# Patient Record
Sex: MALE | Race: WHITE | NOT HISPANIC OR LATINO | URBAN - METROPOLITAN AREA
[De-identification: names, ages, dates, MRNs, and addresses within clinical notes are randomized per-mention and may not be internally consistent; named-entity substitution may affect disease eponyms.]

---

## 2017-01-01 ENCOUNTER — OFFICE VISIT - HEALTHEAST (OUTPATIENT)
Dept: GERIATRICS | Facility: CLINIC | Age: 57
End: 2017-01-01

## 2017-01-01 ENCOUNTER — AMBULATORY - HEALTHEAST (OUTPATIENT)
Dept: GERIATRICS | Facility: CLINIC | Age: 57
End: 2017-01-01

## 2017-01-01 ENCOUNTER — SURGERY - HEALTHEAST (OUTPATIENT)
Dept: SURGERY | Facility: CLINIC | Age: 57
End: 2017-01-01

## 2017-01-01 ENCOUNTER — OFFICE VISIT - HEALTHEAST (OUTPATIENT)
Dept: WOUND CARE | Facility: HOSPITAL | Age: 57
End: 2017-01-01

## 2017-01-01 ENCOUNTER — ANESTHESIA - HEALTHEAST (OUTPATIENT)
Dept: SURGERY | Facility: CLINIC | Age: 57
End: 2017-01-01

## 2017-01-01 ENCOUNTER — AMBULATORY - HEALTHEAST (OUTPATIENT)
Dept: SCHEDULING | Facility: CLINIC | Age: 57
End: 2017-01-01

## 2017-01-01 DIAGNOSIS — G89.4 CHRONIC PAIN SYNDROME: ICD-10-CM

## 2017-01-01 DIAGNOSIS — N18.5 CRD (CHRONIC RENAL DISEASE), STAGE 5: ICD-10-CM

## 2017-01-01 DIAGNOSIS — E11.9 TYPE 2 DIABETES MELLITUS (H): ICD-10-CM

## 2017-01-01 DIAGNOSIS — B20 AIDS (H): ICD-10-CM

## 2017-01-01 DIAGNOSIS — K74.60 LIVER CIRRHOSIS (H): ICD-10-CM

## 2017-01-01 DIAGNOSIS — G89.29 CHRONIC PAIN: ICD-10-CM

## 2017-01-01 DIAGNOSIS — I10 ESSENTIAL HYPERTENSION: ICD-10-CM

## 2017-01-01 DIAGNOSIS — Z93.3 COLOSTOMY IN PLACE (H): ICD-10-CM

## 2017-01-01 DIAGNOSIS — Z93.2 ILEOSTOMY IN PLACE (H): ICD-10-CM

## 2017-01-01 DIAGNOSIS — T79.A21A TRAUMATIC COMPARTMENT SYNDROME OF RIGHT LOWER EXTREMITY, INITIAL ENCOUNTER (H): ICD-10-CM

## 2017-01-01 DIAGNOSIS — D64.9 ANEMIA: ICD-10-CM

## 2017-01-01 DIAGNOSIS — T79.A29A: ICD-10-CM

## 2017-01-01 DIAGNOSIS — D61.818 PANCYTOPENIA (H): ICD-10-CM

## 2017-01-01 DIAGNOSIS — N99.528 COMPLICATION OF UROSTOMY (H): ICD-10-CM

## 2017-01-01 DIAGNOSIS — T79.A21S TRAUMATIC COMPARTMENT SYNDROME OF RIGHT LOWER EXTREMITY, SEQUELA: ICD-10-CM

## 2017-01-01 DIAGNOSIS — D62 ACUTE BLOOD LOSS ANEMIA: ICD-10-CM

## 2017-01-01 DIAGNOSIS — Z75.8 DISCHARGE PLANNING ISSUES: ICD-10-CM

## 2017-01-01 DIAGNOSIS — T79.A21D TRAUMATIC COMPARTMENT SYNDROME OF RIGHT LOWER EXTREMITY, SUBSEQUENT ENCOUNTER: ICD-10-CM

## 2017-01-01 DIAGNOSIS — R53.83 LETHARGY: ICD-10-CM

## 2017-01-01 ASSESSMENT — MIFFLIN-ST. JEOR
SCORE: 1503.91
SCORE: 1503.91

## 2021-05-30 VITALS
HEIGHT: 64 IN | WEIGHT: 172.6 LBS | WEIGHT: 172.6 LBS | HEIGHT: 64 IN | BODY MASS INDEX: 29.47 KG/M2 | BODY MASS INDEX: 29.47 KG/M2

## 2021-05-31 VITALS — BODY MASS INDEX: 28.06 KG/M2 | WEIGHT: 163.5 LBS

## 2021-05-31 VITALS — BODY MASS INDEX: 28.37 KG/M2 | WEIGHT: 165.3 LBS

## 2021-05-31 VITALS — BODY MASS INDEX: 28.91 KG/M2 | WEIGHT: 168.4 LBS

## 2021-05-31 VITALS — WEIGHT: 172.7 LBS | BODY MASS INDEX: 29.64 KG/M2

## 2021-06-09 NOTE — ANESTHESIA POSTPROCEDURE EVALUATION
Patient: Hosea Garrison  IRRIGATION AND DEBRIDEMENT WITH WOUND RECLOSURE OF RIGHT LEG  Anesthesia type: general    Patient location: PACU  Last vitals:   Vitals:    04/07/17 1136   BP: 126/72   Pulse: 82   Resp: 16   Temp:    SpO2: 100%     Post vital signs: stable  Level of consciousness: awake and responds to simple questions  Post-anesthesia pain: pain controlled  Post-anesthesia nausea and vomiting: no  Pulmonary: unassisted, return to baseline  Cardiovascular: stable and blood pressure at baseline  Hydration: adequate  Anesthetic events: no    QCDR Measures:  ASA# 11 - Erin-op Cardiac Arrest: ASA11B - Patient did NOT experience unanticipated cardiac arrest  ASA# 12 - Erin-op Mortality Rate: ASA12B - Patient did NOT die  ASA# 13 - PACU Re-Intubation Rate: ASA13B - Patient did NOT require a new airway mgmt  ASA# 10 - Composite Anes Safety: ASA10A - No serious adverse event  ASA# 38 - New Corneal Injury: ASA38A - No new exposure keratitis or corneal abrasion in PACU    Additional Notes:

## 2021-06-09 NOTE — ANESTHESIA CARE TRANSFER NOTE
Pt breathing spontaneously, LMA out. Spontaneous respirations with SFM to PACU. VSS.    Last vitals:   Vitals:    04/07/17 1136   BP: 126/72   Pulse: 82   Resp: 16   Temp:    SpO2: 100%     Patient's level of consciousness is drowsy  Spontaneous respirations: yes  Maintains airway independently: yes  Dentition unchanged: yes  Oropharynx: oropharynx clear of all foreign objects    QCDR Measures:  ASA# 20 - Surgical Safety Checklist: ASA20A - Safety Checks Done  PQRS# 430 - Adult PONV Prevention: NA - Not adult patient, not GA or 3 or more risk factors NOT present  ASA# 8 - Peds PONV Prevention: NA - Not pediatric patient, not GA or 2 or more risk factors NOT present  PQRS# 424 - Erin-op Temp Management: 4559F - At least one body temp DOCUMENTED => 35.5C or 95.9F within required timeframe  PQRS# 426 - PACU Transfer Protocol: - Transfer of care checklist used  ASA# 14 - Acute Post-op Pain: ASA14B - Patient did NOT experience pain >= 7 out of 10    I completed my SBAR handoff to the receiving nurse per policy and procedure.

## 2021-06-09 NOTE — ANESTHESIA PREPROCEDURE EVALUATION
Anesthesia Evaluation        Airway   Mallampati: II  Neck ROM: full   Pulmonary    (+) decreased breath sounds (pleural effusion R.), a smoker    ROS comment: Pleural effusion                         Cardiovascular - normal exam  (+) hypertension, ,      Neuro/Psych      Endo/Other    (+) diabetes mellitus using insulin,      GI/Hepatic/Renal    (+)   chronic renal disease,     Comments: H/O lower GI bleed.  Cirrhosis     Other findings: HIV+.  Gluc=133  Ca=7.6  Cr=4.99  Hgb=7.1  Plt=79K             Dental    (+) edentulous                         Anesthesia Plan  Planned anesthetic: general LMA    ASA 3   Induction: intravenous   Anesthetic plan and risks discussed with: patient  Anesthesia plan special considerations: antiemetics,   Post-op plan: routine recovery

## 2021-06-09 NOTE — ANESTHESIA CARE TRANSFER NOTE
Last vitals:   Vitals:    04/06/17 0334   BP: 131/73   Pulse: 88   Resp: 16   Temp: 36.8  C (98.3  F)   SpO2: 100%     Patient's level of consciousness is awake  Spontaneous respirations: yes  Maintains airway independently: yes  Dentition unchanged: yes  Oropharynx: oropharynx clear of all foreign objects    QCDR Measures:  ASA# 20 - Surgical Safety Checklist: ASA20A - Safety Checks Done  PQRS# 430 - Adult PONV Prevention: 4558F-8P - Pt did NOT receive => 2 anti-emetic agents  ASA# 8 - Peds PONV Prevention: NA - Not pediatric patient, not GA or 2 or more risk factors NOT present  PQRS# 424 - Erin-op Temp Management: 4559F - At least one body temp DOCUMENTED => 35.5C or 95.9F within required timeframe  PQRS# 426 - PACU Transfer Protocol: - Transfer of care checklist used  ASA# 14 - Acute Post-op Pain: ASA14B - Patient did NOT experience pain >= 7 out of 10    I completed my SBAR handoff to the receiving nurse per policy and procedure.

## 2021-06-09 NOTE — ANESTHESIA POSTPROCEDURE EVALUATION
Patient: Hosea Garrison  FASCIOTOMY RIGHT LOWER LEG  Anesthesia type: general    Patient location: PACU  Last vitals:   Vitals:    04/06/17 0522   BP:    Pulse:    Resp:    Temp: (P) 36.7  C (98.1  F)   SpO2:      Post vital signs: stable  Level of consciousness: awake and responds to simple questions  Post-anesthesia pain: pain controlled  Post-anesthesia nausea and vomiting: no  Pulmonary: unassisted, return to baseline  Cardiovascular: stable and blood pressure at baseline  Hydration: adequate  Anesthetic events: no    QCDR Measures:  ASA# 11 - Erin-op Cardiac Arrest: ASA11B - Patient did NOT experience unanticipated cardiac arrest  ASA# 12 - Erin-op Mortality Rate: ASA12B - Patient did NOT die  ASA# 13 - PACU Re-Intubation Rate: ASA13B - Patient did NOT require a new airway mgmt  ASA# 10 - Composite Anes Safety: ASA10A - No serious adverse event  ASA# 38 - New Corneal Injury: ASA38A - No new exposure keratitis or corneal abrasion in PACU    Additional Notes:

## 2021-06-09 NOTE — ANESTHESIA PREPROCEDURE EVALUATION
Anesthesia Evaluation        Airway   Mallampati: II  Neck ROM: full   Pulmonary    (+) decreased breath sounds (pleural effusion R.), a smoker    ROS comment: Pleural effusion                         Cardiovascular - normal exam  (+) hypertension, ,      Neuro/Psych      Endo/Other    (+) diabetes mellitus using insulin,      GI/Hepatic/Renal    (+)   chronic renal disease CRI and ARF,     Comments: H/O lower GI bleed.  Cirrhosis     Other findings: HIV+.  Gluc=145  Ca=7.0  Cr=4.3  Hgb=7.9             Dental    (+) edentulous                       Anesthesia Plan  Planned anesthetic: general endotracheal    ASA 3   Induction: intravenous   Anesthetic plan and risks discussed with: patient  Anesthesia plan special considerations: rapid sequence induction,

## 2021-06-10 NOTE — PROGRESS NOTES
[facility]        Visit Type: Review Of Multiple Medical Conditions (Liver cirrhosis, type 2 diabetes, CRD)    Code Status:  FULL CODE  Facility:  [facility] [[id number]]          PCP: No Primary Care Provider       PHONE: None     FAX:[phone]        ASSESSMENT/PLAN:  1. Liver cirrhosis     2. CRD (chronic renal disease), stage 5     3. Type 2 diabetes mellitus     4. Compartment syndrome, traumatic, lower extremity     5. Chronic pain     6. Acute blood loss anemia     7. AIDS     8. Essential hypertension       1. Compartment syndrome, traumatic, lower extremity   status post fasciotomy and incision and drainage of wound, also has a right tibial fracture now with intermittent pain.  A long discussion was done with the patient regarding his pain medications.  He is on a fentanyl patch and also on 10-15 mg every 4 hours oxycodone.  We did discuss risks and benefits of narcotic use.  Ortho follow up done.  Patient is now on a long leg cast and will return to or so in 5 weeks time for cast removal and placement of a hinged knee brace    2. Acute blood loss anemia   now with pancytopenia, hemoglobin low at 7.6, no evidence of ongoing blood loss.  Pancytopenia is multifactorial with acute blood loss anemia from surgery, CRD stage V, liver cirrhosis, HIV/AIDS.  Aranesp has been started by renal MD    3. CRD (chronic renal disease), stage 5   GFR is lower at 12 from 17 with creatinine at 4.84 from 3.81, and BUN 81 from 60.  Will follow multiple recommendations from renal MD. his weight has decreased by 3 pounds after aggressive diuresis.     4. Liver cirrhosis   LFTs normal except for low albumin, INR 1.2, NH 4 at 76 from 63.  We increased lactulose 45 mL in a.m., 30 mL twice daily improvement in lethargy    5. AIDS   stable on ART, we will need to make sure the patient has a 6 month follow-up with ID    6. Type 2 diabetes mellitus   check  hemoglobin A1c, blood sugars range of 100-202, blood sugars improved with decreasing Lantus to 10 units daily and changing NovoLog sliding scale as follows, less than 180 no insulin sliding scale, 1  units, 221-263 units, 2  units, 301-345 units, 3  units, greater than 408 units.  Hold NovoLog sliding scale for blood sugars less than 100.  Again, long discussion was done regarding hypoglycemia as 1 of the possible causes of his lethargy.  Explained rationality for changing doses of his Lantus and NovoLog sliding scale.     7. Chronic pain   as above discussed pain management    8. Essential hypertension   blood pressures are all stable          HISTORY OF PRESENT ILLNESS:   Hosea Garrison is a 56 y.o. male with a history of anal squamous cell cancer status post resection, colostomy, diverting urostomy, kidney cancer status post nephrectomy, liver cirrhosis with recurrent ascites and paracentesis, hepatitis B, HIV/AIDS, type 2 diabetes, hypertension, chronic pain who was just recently discharged from [facility] but fell in his home and injured his right knee.  X-ray showed transverse comminuted impacted fracture of the proximal tibial metaphysis.  Because of his significant amount of pain, he was found to have compartment syndrome and he was taken to the OR emergently by orthopedic surgeon and underwent fasciotomy of the right lower leg on 4/6/17.  He then underwent irrigation and debridement with wound reclosure of the right leg on 4/7/17.  The patient suffered from acute blood loss anemia and received 1 unit packed RBC perioperatively.  On the day prior to his discharge he developed some bleeding at the junction of colostomy site which resolved with pressure and placement of a new pouch.  He was seen by ostomy nurse.  He has not had any recurrence of his bleeding.    Currently, he states that his pain on his right leg is improving on a fentanyl patch and oxycodone but states that  occasionally oxycodone does not work despite big doses.  States that his lethargy and sleepiness has improved after increasing lactulose especially during the day.  His NH 4 is higher at 76 from 63 and his GFR is at 12, will await labs.  He also has pancytopenia with hemoglobin at 7.6, WBC 1.8, platelet 104.  He was seen by renal MD and recommendation is to increase torsemide to 20 mg twice daily, start metoprolol 5 mg daily for 5 days, low-sodium diet and daily weights and 1.5 L water restriction.  His weight has decreased by about 3 pounds.  He was also started on aranesp.  He denies any nausea or vomiting, abdominal pain..  He also denies any fevers or chills, chest pains or shortness of breath.  He states that he still feels weak after his surgery but he is able to participate in physical therapy despite his pain.  His blood sugars have improved after we have decreased Lantus and he does not have any hypo-glycemic events.  He is on Lantus and NovoLog sliding scale.    Other review of systems are negative.      PAST MEDICAL/SURGICAL HISTORY:  Past Medical History:   Diagnosis Date     AIDS (acquired immune deficiency syndrome)      Cancer     History of Kidney & Rectal Cancer     Chronic renal failure      Cirrhosis      Colostomy in place      Diabetes mellitus     Type II     History of urinary tract infection      Hx of fever      Hydronephrosis      Hypertension      Ileostomy in place      Lower GI bleed      Thrombocytopenia      Past Surgical History:   Procedure Laterality Date     COLON SURGERY       COLOSTOMY Left      FASCIOTOMY Right 4/6/2017    Procedure: FASCIOTOMY RIGHT LOWER LEG;  Surgeon: Quentin Landis MD;  Location: [facility];  Service:      FRACTURE SURGERY Right     right knee and right ankle surgery     ILEOSTOMY Right      PICC  6/18/2015            SOCIAL HISTORY: He is single and lives alone in his own apartment, he has been in a relationship with his partner for 30 years  although they are not living together currently.  He is a smoker, one fourth pack per day for 30 years although he states that his last cigarette was 3 months ago.  He quit alcohol many years ago.      MEDICATIONS:  Reviewed per TCU orders summary    ALLERGIES:  Allergies   Allergen Reactions     Compazine [Prochlorperazine]      Parkinson's type symptoms     Sulfacetamide Other (See Comments)     Patient can't remember         PHYSICAL EXAMINATION:  Vital signs 123/69, 96.7, 72, 16, 98%, weight 153 pounds  General: Slightly sleepy, oriented x3, not in any form of acute distress, answers questions appropriately, follows simple commands, less conversant, pale, has a sad affect  HEENT: Pale conjunctiva, anicteric sclerae, oral mucosa is moist, oral thrush resolved, no upper or lower teeth   NECK: Supple, without any lymphadenopathy, thyromegaly or any masses  LUNG: Clear to auscultation, good chest expansion. There are no crackles, no wheezes, normal AP diameter  BACK: No kyphosis of the thoracic spine  CVS: There is good S1  S2, there are no murmurs, no heaves, rhythm is regular  ABDOMEN: Globular with ascites, soft, nontender to palpation, no organomegaly, good bowel sounds, positive colostomy, site is clean, positive ileostomy conduit with clear urine per catheter  EXTREMITIES: Good range of motion on both upper and lower extremities except on right lower extremity which is wrapped in a long leg cast, able to wiggle right toes, left leg with trace pedal edema, no cyanosis or clubbing, no calf tenderness, right arm AV fistula with good bruits  SKIN: Warm and dry, no rashes or erythema noted    LABS:  Lab Results   Component Value Date    WBC 1.9 (LL) 04/12/2017    HGB 8.2 (L) 04/12/2017    HCT 23.8 (L) 04/12/2017    MCV 96 04/12/2017    PLT 93 (L) 04/12/2017     Lab Results   Component Value Date    CREATININE 3.88 (H) 04/12/2017    BUN 64 (H) 04/12/2017     04/12/2017    K 5.0 04/12/2017     (H)  04/12/2017    CO2 16 (L) 04/12/2017           35 minutes of total time spent, greater than 55% of the time spent in coordination of care and counseling regarding the above medical issues and plan of care. I have reviewed the patient's medical records, labs and medications.       Electronically signed by:Zeynep Mijares MD

## 2021-06-10 NOTE — PROGRESS NOTES
[facility]        Visit Type: Review Of Multiple Medical Conditions (End-stage liver disease, end-stage renal disease, type 2 diabetes, compartment syndrome)    Code Status:  FULL CODE  Facility:  [facility] [[id number]]          PCP: No Primary Care Provider       PHONE: None     FAX:[phone]        ASSESSMENT/PLAN:  1. Liver cirrhosis     2. CRD (chronic renal disease), stage 5     3. Type 2 diabetes mellitus     4. Chronic pain     5. Compartment syndrome, traumatic, lower extremity     6. Essential hypertension     7. AIDS     8. Pancytopenia       1. Compartment syndrome, traumatic, lower extremity   status post fasciotomy and incision and drainage of wound, also has a right tibial fracture now with intermittent pain.  A long discussion was done with the patient regarding his pain medications.  He is on a fentanyl patch and we have decreased oxycodone to 10-15 mg every 6 hours as needed.  He is to further decrease oxycodone in the next few days if he does not have a lot of pain.  We did discuss risks and benefits of narcotic use.  Ortho follow up done and he could do partial weightbearing up to 50% of body weight.  He will need further rehab as he has 3 flights of steps to navigate when he goes home.  Patient is now on a long leg cast was supposed to return  in 5 weeks time for cast removal and placement of a hinged knee brace but was seen again by orthopedics since he wants to go home soon but he has a lot of stairs in his home.  Per orthopedics, he can do some toe touching, partial weightbearing up to 50% of body weight.     2. Acute blood loss anemia   now with pancytopenia, hemoglobin low at 8.3 from 7.6, no evidence of ongoing blood loss.  Pancytopenia is multifactorial with acute blood loss anemia from surgery, CRD stage V, liver cirrhosis, HIV/AIDS.  Aranesp has been started by renal MD and he seen this week for another  injection.  He also completed iron infusion, i.e. Venofer ×2 doses    3. CRD (chronic renal disease), stage 5   GFR is lower at 12 from 17 with creatinine at 4.84 from 3.81, and BUN 81 from 60.  Will follow multiple recommendations from renal MD. his weight has decreased by 3 pounds after aggressive diuresis.  He is now off metolazone and we would expect GFR to increase.  Will check BMP on 5/25    4. Liver cirrhosis   LFTs normal except for low albumin, INR 1.2, NH 4 at 161 from 63.  We increased lactulose 45 mL 3 times daily, no hepatic encephalopathy.  Will check NH4 on 5/25    5. AIDS   stable on ART, we will need to make sure the patient has a 6 month follow-up with ID    6. Type 2 diabetes mellitus   check hemoglobin A1c, blood sugars range of 106-200.   We increased Lantus to 12 units daily, continue NovoLog sliding scale    7. Chronic pain   as above discussed pain management extensively   8. Essential hypertension   blood pressures are all stable          HISTORY OF PRESENT ILLNESS:   Hosea Garrison is a 56 y.o. male with a history of anal squamous cell cancer status post resection, colostomy, diverting urostomy, kidney cancer status post nephrectomy, liver cirrhosis with recurrent ascites and paracentesis, hepatitis B, HIV/AIDS, type 2 diabetes, hypertension, chronic pain who was just recently discharged from [facility] but fell in his home and injured his right knee.  X-ray showed transverse comminuted impacted fracture of the proximal tibial metaphysis.  Because of his significant amount of pain, he was found to have compartment syndrome and he was taken to the OR emergently by orthopedic surgeon and underwent fasciotomy of the right lower leg on 4/6/17.  He then underwent irrigation and debridement with wound reclosure of the right leg on 4/7/17.  The patient suffered from acute blood loss anemia and received 1 unit packed RBC perioperatively.  On the day prior to his discharge he developed some  bleeding at the junction of colostomy site which resolved with pressure and placement of a new pouch.  He was seen by ostomy nurse.  He has not had any recurrence of his bleeding.    Currently, he states that his pain on his right leg is improving on a fentanyl patch and oxycodone but states that occasionally oxycodone does not work despite big doses.  A long discussion was done regarding decreasing his pain medications and he reluctantly agreed to change frequency of chronic use.  He is now on oxycodone 10-15 mg 4 times daily as needed.  States that his lethargy and sleepiness has improved after increasing lactulose especially during the day.  His NH 4 is higher at 166 from 63 and his GFR is at 12.  He also has pancytopenia with hemoglobin at 8.3, WBC 2.2, platelet 92.  He was seen by renal MD and recommendation is to increase torsemide to 20 mg twice daily, plated metolazone 5 mg daily for 5 days, low-sodium diet and daily weights and 1.5 L water restriction.  His weight has decreased by about 3 pounds.  He was also started on aranesp and had another injection next week.  His most recent hemoglobin on 5/18 is 8.3.  He denies any nausea or vomiting, abdominal pain..  He also denies any fevers or chills, chest pains or shortness of breath.  He states that he still feels weak after his surgery but he is able to participate in physical therapy despite his pain.  His blood sugars are now improving after we have decreased Lantus but he does not have any hypo-glycemic events.  He is on Lantus and NovoLog sliding scale.    Other review of systems are negative.      PAST MEDICAL/SURGICAL HISTORY:  Past Medical History:   Diagnosis Date     AIDS (acquired immune deficiency syndrome)      Cancer     History of Kidney & Rectal Cancer     Chronic renal failure      Cirrhosis      Colostomy in place      Diabetes mellitus     Type II     History of urinary tract infection      Hx of fever      Hydronephrosis      Hypertension       Ileostomy in place      Lower GI bleed      Thrombocytopenia      Past Surgical History:   Procedure Laterality Date     COLON SURGERY       COLOSTOMY Left      FASCIOTOMY Right 4/6/2017    Procedure: FASCIOTOMY RIGHT LOWER LEG;  Surgeon: Quentin Landis MD;  Location: [facility];  Service:      FRACTURE SURGERY Right     right knee and right ankle surgery     ILEOSTOMY Right      PICC  6/18/2015            SOCIAL HISTORY: He is single and lives alone in his own apartment, he has been in a relationship with his partner for 30 years although they are not living together currently.  He is a smoker, one fourth pack per day for 30 years although he states that his last cigarette was 3 months ago.  He quit alcohol many years ago.      MEDICATIONS:  Reviewed per TCU orders summary    ALLERGIES:  Allergies   Allergen Reactions     Compazine [Prochlorperazine]      Parkinson's type symptoms     Sulfacetamide Other (See Comments)     Patient can't remember         PHYSICAL EXAMINATION:  Vital signs able  General: Alert, oriented x3, not in any form of acute distress, answers questions appropriately, follows simple commands, less conversant, pale, has a sad affect  HEENT: Pale conjunctiva, anicteric sclerae, oral mucosa is moist, oral thrush resolved, no upper or lower teeth   NECK: Supple, without any lymphadenopathy, thyromegaly or any masses  BACK: No kyphosis of the thoracic spine  ABDOMEN: Globular with ascites, soft, nontender to palpation, no organomegaly,  colostomy, site is clean, ileostomy conduit with clear urine per catheter  EXTREMITIES: Good range of motion on both upper and lower extremities except on right lower extremity which is wrapped in a long leg cast, able to wiggle right toes, left leg with trace pedal edema, no cyanosis or clubbing, no calf tenderness, right arm AV fistula with good bruits  SKIN: Warm and dry, no rashes or erythema noted    LABS:  Lab Results   Component Value Date     WBC 1.9 (LL) 04/12/2017    HGB 8.2 (L) 04/12/2017    HCT 23.8 (L) 04/12/2017    MCV 96 04/12/2017    PLT 93 (L) 04/12/2017     Lab Results   Component Value Date    CREATININE 3.88 (H) 04/12/2017    BUN 64 (H) 04/12/2017     04/12/2017    K 5.0 04/12/2017     (H) 04/12/2017    CO2 16 (L) 04/12/2017       35 minutes TT spent, >50% in coordination of care and counseling regarding the above medical issues and plan of care.  A long discussion was done with patient and nursing staff regarding above problems.  I reviewed medications/labs/available medical records.    Electronically signed by:Zeynep Mijares MD

## 2021-06-10 NOTE — PROGRESS NOTES
[facility]        Visit Type: Review Of Multiple Medical Conditions (Liver cirrhosis, CRD, compartment syndrome)    Code Status:  FULL CODE  Facility:  [facility] [[id number]]          PCP: No Primary Care Provider       PHONE: None     FAX:[phone]        ASSESSMENT/PLAN:  1. Liver cirrhosis     2. CRD (chronic renal disease), stage 5     3. Type 2 diabetes mellitus     4. Compartment syndrome, traumatic, lower extremity     5. AIDS     6. Chronic pain     7. Essential hypertension     8. Acute blood loss anemia       1. Compartment syndrome, traumatic, lower extremity   status post fasciotomy and incision and drainage of wound, also has a right tibial fracture now with intermittent pain.  A long discussion was done with the patient regarding his pain medications.  He is on a fentanyl patch and also on 10-15 mg every 4 hours oxycodone.  We did discuss risks and benefits of narcotic use.  Ortho follow up today to see if he can get an okay to do more weightbearing as he has 3 flights of steps to navigate when he goes home.  Patient is now on a long leg cast and will return to or so in 5 weeks time for cast removal and placement of a hinged knee brace .  Discussed placement issues with     2. Acute blood loss anemia   now with pancytopenia, hemoglobin low at 7.6, no evidence of ongoing blood loss.  Pancytopenia is multifactorial with acute blood loss anemia from surgery, CRD stage V, liver cirrhosis, HIV/AIDS.  Aranesp has been started by renal MD and he has a follow-up this week for another injection    3. CRD (chronic renal disease), stage 5   GFR is lower at 12 from 17 with creatinine at 4.84 from 3.81, and BUN 81 from 60.  Will follow multiple recommendations from renal MD. his weight has decreased by 3 pounds after aggressive diuresis.  He is now off metolazone and we would expect GFR to increase    4. Liver cirrhosis   LFTs  normal except for low albumin, INR 1.2, NH 4 at 161 from 63.  We increased lactulose 45 mL 3 times daily    5. AIDS   stable on ART, we will need to make sure the patient has a 6 month follow-up with ID    6. Type 2 diabetes mellitus   check hemoglobin A1c, blood sugars range of 127-190, blood sugars improved with decreasing Lantus to 10 units daily and changing NovoLog sliding scale as follows, less than 180 no insulin sliding scale, 1  units, 221-263 units, 2  units, 301-345 units, 3  units, greater than 408 units.  Hold NovoLog sliding scale for blood sugars less than 100.  Again, long discussion was done regarding hypoglycemia as 1 of the possible causes of his lethargy.  Explained rationality for changing doses of his Lantus and NovoLog sliding scale.     7. Chronic pain   as above discussed pain management extensively   8. Essential hypertension   blood pressures are all stable          HISTORY OF PRESENT ILLNESS:   Hosea Garrison is a 56 y.o. male with a history of anal squamous cell cancer status post resection, colostomy, diverting urostomy, kidney cancer status post nephrectomy, liver cirrhosis with recurrent ascites and paracentesis, hepatitis B, HIV/AIDS, type 2 diabetes, hypertension, chronic pain who was just recently discharged from [facility] but fell in his home and injured his right knee.  X-ray showed transverse comminuted impacted fracture of the proximal tibial metaphysis.  Because of his significant amount of pain, he was found to have compartment syndrome and he was taken to the OR emergently by orthopedic surgeon and underwent fasciotomy of the right lower leg on 4/6/17.  He then underwent irrigation and debridement with wound reclosure of the right leg on 4/7/17.  The patient suffered from acute blood loss anemia and received 1 unit packed RBC perioperatively.  On the day prior to his discharge he developed some bleeding at the junction of colostomy site which  resolved with pressure and placement of a new pouch.  He was seen by ostomy nurse.  He has not had any recurrence of his bleeding.    Currently, he states that his pain on his right leg is improving on a fentanyl patch and oxycodone but states that occasionally oxycodone does not work despite big doses.  A long discussion was done regarding decreasing his pain medications but he states that he is not ready at this point to decrease his narcotics.  States that his lethargy and sleepiness has improved after increasing lactulose especially during the day.  His NH 4 is higher at 161 from 63 and his GFR is at 12.  He also has pancytopenia with hemoglobin at 7.6, WBC 1.8, platelet 104.  He was seen by renal MD and recommendation is to increase torsemide to 20 mg twice daily, plated metolazone 5 mg daily for 5 days, low-sodium diet and daily weights and 1.5 L water restriction.  His weight has decreased by about 3 pounds.  He was also started on aranesp.  He denies any nausea or vomiting, abdominal pain..  He also denies any fevers or chills, chest pains or shortness of breath.  He states that he still feels weak after his surgery but he is able to participate in physical therapy despite his pain.  His blood sugars have improved after we have decreased Lantus and he does not have any hypo-glycemic events.  He is on Lantus and NovoLog sliding scale.    Other review of systems are negative.      PAST MEDICAL/SURGICAL HISTORY:  Past Medical History:   Diagnosis Date     AIDS (acquired immune deficiency syndrome)      Cancer     History of Kidney & Rectal Cancer     Chronic renal failure      Cirrhosis      Colostomy in place      Diabetes mellitus     Type II     History of urinary tract infection      Hx of fever      Hydronephrosis      Hypertension      Ileostomy in place      Lower GI bleed      Thrombocytopenia      Past Surgical History:   Procedure Laterality Date     COLON SURGERY       COLOSTOMY Left      FASCIOTOMY  Right 4/6/2017    Procedure: FASCIOTOMY RIGHT LOWER LEG;  Surgeon: Quentin Landis MD;  Location: [facility];  Service:      FRACTURE SURGERY Right     right knee and right ankle surgery     ILEOSTOMY Right      PICC  6/18/2015            SOCIAL HISTORY: He is single and lives alone in his own apartment, he has been in a relationship with his partner for 30 years although they are not living together currently.  He is a smoker, one fourth pack per day for 30 years although he states that his last cigarette was 3 months ago.  He quit alcohol many years ago.      MEDICATIONS:  Reviewed per TCU orders summary    ALLERGIES:  Allergies   Allergen Reactions     Compazine [Prochlorperazine]      Parkinson's type symptoms     Sulfacetamide Other (See Comments)     Patient can't remember         PHYSICAL EXAMINATION:  Vital signs 123/63, 96.6, 57, 22, 98%  General: Alert, oriented x3, not in any form of acute distress, answers questions appropriately, follows simple commands, less conversant, pale, has a sad affect  HEENT: Pale conjunctiva, anicteric sclerae, oral mucosa is moist, oral thrush resolved, no upper or lower teeth   NECK: Supple, without any lymphadenopathy, thyromegaly or any masses  LUNG: Clear to auscultation, good chest expansion. There are no crackles, no wheezes, normal AP diameter  BACK: No kyphosis of the thoracic spine  CVS: There is good S1  S2, there are no murmurs, no heaves, rhythm is regular  ABDOMEN: Globular with ascites, soft, nontender to palpation, no organomegaly, good bowel sounds,  colostomy, site is clean, ileostomy conduit with clear urine per catheter  EXTREMITIES: Good range of motion on both upper and lower extremities except on right lower extremity which is wrapped in a long leg cast, able to wiggle right toes, left leg with trace pedal edema, no cyanosis or clubbing, no calf tenderness, right arm AV fistula with good bruits  SKIN: Warm and dry, no rashes or erythema  noted    LABS:  Lab Results   Component Value Date    WBC 1.9 (LL) 04/12/2017    HGB 8.2 (L) 04/12/2017    HCT 23.8 (L) 04/12/2017    MCV 96 04/12/2017    PLT 93 (L) 04/12/2017     Lab Results   Component Value Date    CREATININE 3.88 (H) 04/12/2017    BUN 64 (H) 04/12/2017     04/12/2017    K 5.0 04/12/2017     (H) 04/12/2017    CO2 16 (L) 04/12/2017       35 minutes TT spent, >50% in coordination of care and counseling regarding the above medical issues and plan of care.  A long discussion was done with patient and nursing staff regarding above problems.  I reviewed medications/labs/available medical records.      Electronically signed by:Zeynep Mijares MD

## 2021-06-10 NOTE — PROGRESS NOTES
[facility]        Visit Type: Review Of Multiple Medical Conditions (Liver cirrhosis, diabetes type 2, CRD)    Code Status:  FULL CODE  Facility:  [facility] [[id number]]          PCP: No Primary Care Provider       PHONE: None     FAX:[phone]        ASSESSMENT/PLAN:  1. Compartment syndrome, traumatic, lower extremity     2. Liver cirrhosis     3. CRD (chronic renal disease), stage 5     4. Type 2 diabetes mellitus     5. Acute blood loss anemia     6. Chronic pain     7. Essential hypertension     8. AIDS       1. Compartment syndrome, traumatic, lower extremity   status post fasciotomy and incision and drainage of wound, also has a right tibial fracture now with intermittent pain.  A long discussion was done with the patient regarding his pain medications.  He states that he does not want his pain medications to be increased but that he wants them to be given on time when he asks for them.  He is on a fentanyl patch and also on 10-15 mg every 4 hours oxycodone.  We did discuss risks and benefits of narcotic use.  Ortho follow up 4/26    2. Acute blood loss anemia   now with pancytopenia, hemoglobin low at 7.6, no evidence of ongoing blood loss.  Pancytopenia is multifactorial with acute blood loss anemia from surgery, CRD stage V, liver cirrhosis, HIV/AIDS.  Follow-up with renal MD for possible EPO shots.  Consider blood transfusion if hemoglobin less than 7.  We also need to follow-up with infectious disease    3. CRD (chronic renal disease), stage 5   GFR is lower at 12 from 17 with creatinine at 4.84 from 3.81, and BUN 81 from 60.  Will decrease torsemide to 20 mg daily.     4. Liver cirrhosis   LFTs normal except for low albumin, INR 1.2, NH 4 at 63.  Give lactulose 30 mL 3 times daily    5. AIDS   stable on ART    6. Type 2 diabetes mellitus   check hemoglobin A1c, blood sugars range of 93/164, blood sugars improved with decreasing  Lantus to 10 units daily and changing NovoLog sliding scale as follows, less than 180 no insulin sliding scale, 1  units, 221-263 units, 2  units, 301-345 units, 3  units, greater than 408 units.  Hold NovoLog sliding scale for blood sugars less than 100    7. Chronic pain   as above discussed pain management    8. Essential hypertension   blood pressures are all stable          HISTORY OF PRESENT ILLNESS:   Hosea Garrison is a 56 y.o. male with a history of anal squamous cell cancer status post resection, colostomy, diverting urostomy, kidney cancer status post nephrectomy, liver cirrhosis with recurrent ascites and paracentesis, hepatitis B, HIV/AIDS, type 2 diabetes, hypertension, chronic pain who was just recently discharged from [facility] but fell in his home and injured his right knee.  X-ray showed transverse comminuted impacted fracture of the proximal tibial metaphysis.  Because of his significant amount of pain, he was found to have compartment syndrome and he was taken to the OR emergently by orthopedic surgeon and underwent fasciotomy of the right lower leg on 4/6/17.  He then underwent irrigation and debridement with wound reclosure of the right leg on 4/7/17.  The patient suffered from acute blood loss anemia and received 1 unit packed RBC perioperatively.  On the day prior to his discharge he developed some bleeding at the junction of colostomy site which resolved with pressure and placement of a new pouch.  He was seen by ostomy nurse.  He has not had any recurrence of his bleeding.    Currently, he states that he still has a lot of pain on his right leg despite getting a fentanyl patch and oxycodone but states that occasionally oxycodone does not work despite big doses.  States that he is more sleepy and lethargic during the day.  His NH 4 is higher at 63 and his GFR is at 12.  He also has pancytopenia with hemoglobin at 7.6, WBC 1.8, platelet 104.  He denies any nausea or  vomiting, abdominal pain, diarrhea despite being on lactulose.  He also denies any fevers or chills, chest pains or shortness of breath.  He states that he still feels weak after his surgery but he is able to participate in physical therapy despite his pain.  His blood sugars have improved after we have decreased Lantus and he does not have any hypo-glycemic events.  He is on Lantus and NovoLog sliding scale.    Other review of systems are negative.      PAST MEDICAL/SURGICAL HISTORY:  Past Medical History:   Diagnosis Date     AIDS (acquired immune deficiency syndrome)      Cancer     History of Kidney & Rectal Cancer     Chronic renal failure      Cirrhosis      Colostomy in place      Diabetes mellitus     Type II     History of urinary tract infection      Hx of fever      Hydronephrosis      Hypertension      Ileostomy in place      Lower GI bleed      Thrombocytopenia      Past Surgical History:   Procedure Laterality Date     COLON SURGERY       COLOSTOMY Left      FASCIOTOMY Right 4/6/2017    Procedure: FASCIOTOMY RIGHT LOWER LEG;  Surgeon: Quentin Landis MD;  Location: [facility];  Service:      FRACTURE SURGERY Right     right knee and right ankle surgery     ILEOSTOMY Right      PICC  6/18/2015            SOCIAL HISTORY: She is single and lives alone in his own apartment, he has been in a relationship with his partner for 30 years although they are not living together currently.  He is a smoker, one fourth pack per day for 30 years although he states that his last cigarette was 3 months ago.  He quit alcohol many years ago.      MEDICATIONS:  Reviewed per TCU orders summary    ALLERGIES:  Allergies   Allergen Reactions     Compazine [Prochlorperazine]      Parkinson's type symptoms     Sulfacetamide Other (See Comments)     Patient can't remember         PHYSICAL EXAMINATION:  Vital signs 29/67, 96.3, 71, 22, 98%  General: Slightly sleepy, oriented x3, not in any form of acute distress, answers  questions appropriately, follows simple commands, less conversant, pale, has a sad affect  HEENT: Pale conjunctiva, anicteric sclerae, oral mucosa is moist, oral thrush noted, no upper or lower teeth   NECK: Supple, without any lymphadenopathy, thyromegaly or any masses  LUNG: Clear to auscultation, good chest expansion. There are no crackles, no wheezes, normal AP diameter  BACK: No kyphosis of the thoracic spine  CVS: There is good S1  S2, there are no murmurs, no heaves, rhythm is regular  ABDOMEN: Globular with ascites, soft, nontender to palpation, no organomegaly, good bowel sounds, positive colostomy, site is clean, positive ileostomy conduit with clear urine per catheter  EXTREMITIES: Good range of motion on both upper and lower extremities except on right lower extremity which is wrapped, left leg with trace pedal edema, no cyanosis or clubbing, no calf tenderness, right arm AV fistula with good bruits  SKIN: Warm and dry, no rashes or erythema noted    LABS:  Lab Results   Component Value Date    WBC 1.9 (LL) 04/12/2017    HGB 8.2 (L) 04/12/2017    HCT 23.8 (L) 04/12/2017    MCV 96 04/12/2017    PLT 93 (L) 04/12/2017     Lab Results   Component Value Date    CREATININE 3.88 (H) 04/12/2017    BUN 64 (H) 04/12/2017     04/12/2017    K 5.0 04/12/2017     (H) 04/12/2017    CO2 16 (L) 04/12/2017           35 minutes of total time spent, greater than 55% of the time spent in coordination of care and counseling regarding the above medical issues and plan of care. I have reviewed the patient's medical records, labs and medications.       Electronically signed by:Zeynep Mijares MD

## 2021-06-10 NOTE — PROGRESS NOTES
[facility]        Visit Type: H & P (Right lower extremity compartment syndrome, ABL anemia, CRD)    Code Status:  FULL CODE  Facility:  [facility] [[id number]]          PCP: No Primary Care Provider       PHONE: None     FAX:[phone]        ASSESSMENT/PLAN:  1. Compartment syndrome, traumatic, lower extremity   status post fasciotomy and incision and drainage of wound, also has a right tibial fracture now with increase in pain.  A long discussion was done with the patient regarding his pain medications.  He states that he does not want his pain medications to be increased but that he wants them to be given on time when he asks for them.  We did discuss risks and benefits of narcotic use.     2. Acute blood loss anemia   will check hemogram on 4/17 no evidence of ongoing blood loss    3. CRD (chronic renal disease), stage 5   will check BMP on 4/17.     4. Liver cirrhosis   will check NH 4, LFTs, INR    5. AIDS   stable on ART    6. Type 2 diabetes mellitus   check hemoglobin A1c, check blood sugars 4 times daily.  Continue Lantus and NovoLog sliding scale    7. Chronic pain   as above discussed pain management    8. Essential hypertension   blood pressures are all stable on torsemide          HISTORY OF PRESENT ILLNESS:   Hosea Garrison is a 56 y.o. male with a history of anal squamous cell cancer status post resection, colostomy, diverting urostomy, kidney cancer status post nephrectomy, liver cirrhosis with recurrent ascites and paracentesis, hepatitis B, HIV/AIDS, type 2 diabetes, hypertension, chronic pain who was just recently discharged from [facility] but fell in his home and injured his right knee.  X-ray showed transverse comminuted impacted fracture of the proximal tibial metaphysis.  Because of his significant amount of pain, he was found to have compartment syndrome and he was taken to the OR emergently by orthopedic  surgeon and underwent fasciotomy of the right lower leg on 4/6/17.  He then underwent irrigation and debridement with wound reclosure of the right leg on 4/7/17.  The patient suffered from acute blood loss anemia and received 1 unit packed RBC perioperatively.  On the day prior to his discharge he developed some bleeding at the junction of colostomy site which resolved with pressure and placement of a new pouch.  He was seen by ostomy nurse.  He has not had any recurrence of his bleeding.    Currently, he states that he still has a lot of pain on his right leg and he states this is because he is not receiving his pain medications the way he used to at home.  Otherwise he states that he is sleeping better and also eating okay and denies any nausea or vomiting, abdominal pain, diarrhea or constipation.  He also denies any fevers or chills, chest pains or shortness of breath.  He states that he still feels weak after his surgery and he thinks that he is not able to participate in physical therapy is much because of his pain.    Other review of systems are negative.      PAST MEDICAL/SURGICAL HISTORY:  Past Medical History:   Diagnosis Date     AIDS (acquired immune deficiency syndrome)      Cancer     History of Kidney & Rectal Cancer     Chronic renal failure      Cirrhosis      Colostomy in place      Diabetes mellitus     Type II     History of urinary tract infection      Hx of fever      Hydronephrosis      Hypertension      Ileostomy in place      Lower GI bleed      Thrombocytopenia      Past Surgical History:   Procedure Laterality Date     COLON SURGERY       COLOSTOMY Left      FASCIOTOMY Right 4/6/2017    Procedure: FASCIOTOMY RIGHT LOWER LEG;  Surgeon: Quentin Landis MD;  Location: [facility];  Service:      FRACTURE SURGERY Right     right knee and right ankle surgery     ILEOSTOMY Right      PICC  6/18/2015            SOCIAL HISTORY: She is single and lives alone in his own apartment, he has  been in a relationship with his partner for 30 years although they are not living together currently.  He is a smoker, one fourth pack per day for 30 years although he states that his last cigarette was 3 months ago.  He quit alcohol many years ago.      FAMILY HISTORY:  No family history on file.    MEDICATIONS:  Current Outpatient Prescriptions on File Prior to Visit   Medication Sig     abacavir (ZIAGEN) 300 mg tablet Take 300 mg by mouth 2 (two) times a day.     acetaminophen (TYLENOL) 500 MG tablet Take 2 tablets (1,000 mg total) by mouth 3 (three) times a day as needed for pain.     albuterol (PROVENTIL HFA;VENTOLIN HFA) 90 mcg/actuation inhaler Inhale 2 puffs every 4 (four) hours as needed for wheezing.     aspirin 325 MG tablet Take 1 tablet (325 mg total) by mouth 2 (two) times a day.     citalopram (CELEXA) 40 MG tablet Take 40 mg by mouth daily.     citric acid-sodium citrate (BICITRA) 500-334 mg/5 mL solution Take 30 mL by mouth 2 (two) times a day. For Chronic metabolic acidosis     ergocalciferol (ERGOCALCIFEROL) 50,000 unit capsule Take 50,000 Units by mouth once a week. Every Saturday     fenofibrate (TRICOR) 48 MG tablet Take 48 mg by mouth daily.     fentaNYL (DURAGESIC) 50 mcg/hr Place 1 patch on the skin every third day.     ferrous sulfate 325 (65 FE) MG tablet Take 1 tablet by mouth 2 (two) times a day.      gabapentin (NEURONTIN) 300 MG capsule Take 300 mg by mouth bedtime.     insulin detemir (LEVEMIR) 100 unit/mL injection Inject 15 Units under the skin at bedtime.     lactulose 10 gram/15 mL (15 mL) Soln Take 30 mL by mouth 3 (three) times a day.      lamiVUDine (EPIVIR) 100 MG tablet Take 1 tablet (100 mg total) by mouth daily.     loratadine (CLARITIN) 10 mg tablet Take 10 mg by mouth every other day.      LORazepam (ATIVAN) 0.5 MG tablet Take 1 tablet (0.5 mg total) by mouth 2 (two) times a day as needed for anxiety.     naphazoline-pheniramine (NAPHCON-A) 0.025-0.3 % ophthalmic solution  Administer 1 drop to both eyes every 6 (six) hours as needed.     nevirapine (VIRAMUNE) 200 mg tablet Take 200 mg by mouth 2 (two) times a day.     NOVOLOG 100 unit/mL injection Check blood sugar three (3) times daily.  11.9 Type 2 without complications     omeprazole (PRILOSEC) 20 MG capsule Take 20 mg by mouth Daily before breakfast.     ondansetron (ZOFRAN) 4 MG tablet Take 4 mg by mouth every 8 (eight) hours as needed for nausea.      oxyCODONE 10 mg Tab Take 10-15 mg by mouth every 4 (four) hours as needed for pain.     QUEtiapine (SEROQUEL) 50 MG tablet Take 50 mg by mouth bedtime.     rifAXIMin (XIFAXAN) 550 mg Tab tablet Take 550 mg by mouth 2 (two) times a day.     senna-docusate (PERICOLACE) 8.6-50 mg tablet Take 1 tablet by mouth daily as needed for constipation.     simethicone (MYLICON) 80 MG chewable tablet Chew 80 mg every 6 (six) hours as needed for flatulence.     sodium bicarbonate 650 MG tablet Take 1 tablet (650 mg total) by mouth 3 (three) times a day.     torsemide (DEMADEX) 20 MG tablet Take 20 mg by mouth 2 (two) times a day.     No current facility-administered medications on file prior to visit.        ALLERGIES:  Allergies   Allergen Reactions     Compazine [Prochlorperazine]      Parkinson's type symptoms     Sulfacetamide Other (See Comments)     Patient can't remember         PHYSICAL EXAMINATION:  Vital signs 121/60, 98.9, 90, 18, 97%  General: Awake, Alert, oriented x3, not in any form of acute distress, answers questions appropriately, follows simple commands, conversant, pale  HEENT: Pale conjunctiva, anicteric sclerae, oral mucosa is moist, oral thrush noted, no upper or lower teeth   NECK: Supple, without any lymphadenopathy, thyromegaly or any masses  LUNG: Clear to auscultation, good chest expansion. There are no crackles, no wheezes, normal AP diameter  BACK: No kyphosis of the thoracic spine  CVS: There is good S1  S2, there are no murmurs, no heaves, rhythm is  regular  ABDOMEN: Globular with ascites, soft, nontender to palpation, no organomegaly, good bowel sounds, positive colostomy, site is clean, positive ileostomy conduit with clear urine per catheter  EXTREMITIES: Good range of motion on both upper and lower extremities except on right lower extremity which is wrapped, left leg with trace pedal edema, no cyanosis or clubbing, no calf tenderness, right arm AV fistula with good bruits  SKIN: Warm and dry, no rashes or erythema noted    LABS:  Lab Results   Component Value Date    WBC 1.9 (LL) 04/12/2017    HGB 8.2 (L) 04/12/2017    HCT 23.8 (L) 04/12/2017    MCV 96 04/12/2017    PLT 93 (L) 04/12/2017     Lab Results   Component Value Date    CREATININE 3.88 (H) 04/12/2017    BUN 64 (H) 04/12/2017     04/12/2017    K 5.0 04/12/2017     (H) 04/12/2017    CO2 16 (L) 04/12/2017           45 minutes of total time spent, greater than 55% of the time spent in coordination of care and counseling regarding the above medical issues and plan of care. I have reviewed the patient's medical records, labs and medications.       Electronically signed by:Zeynep Mijares MD

## 2021-06-10 NOTE — PROGRESS NOTES
[facility]        Visit Type: Review Of Multiple Medical Conditions (Compartment syndrome, type 2 diabetes, anemia)    Code Status:  FULL CODE  Facility:  [facility] [[id number]]          PCP: No Primary Care Provider       PHONE: None     FAX:[phone]        ASSESSMENT/PLAN:  1. Compartment syndrome, traumatic, lower extremity     2. Acute blood loss anemia     3. CRD (chronic renal disease), stage 5     4. Liver cirrhosis     5. AIDS     6. Type 2 diabetes mellitus     7. Chronic pain     8. Essential hypertension       1. Compartment syndrome, traumatic, lower extremity   status post fasciotomy and incision and drainage of wound, also has a right tibial fracture now with increase in pain.  A long discussion was done with the patient regarding his pain medications.  He states that he does not want his pain medications to be increased but that he wants them to be given on time when he asks for them.  He is on a fentanyl patch and also on oxycodone.  We did discuss risks and benefits of narcotic use.     2. Acute blood loss anemia   hemoglobin low at 7.7, no evidence of ongoing blood loss.  Check hemogram on 4/24, follow-up with renal MD for possible EPO shots.  Consider blood transfusion if hemoglobin less than 7    3. CRD (chronic renal disease), stage 5   will check BMP on 4/24.     4. Liver cirrhosis   LFTs normal except for low albumin, INR 1.2, NH 4 at 59.  We will start lactulose 15 cc daily    5. AIDS   stable on ART    6. Type 2 diabetes mellitus   check hemoglobin A1c, blood sugars range of 118 220.  Continue Lantus and NovoLog sliding scale    7. Chronic pain   as above discussed pain management    8. Essential hypertension   blood pressures are all stable on torsemide, electrolytes normal          HISTORY OF PRESENT ILLNESS:   Hosea Garrison is a 56 y.o. male with a history of anal squamous cell cancer status post resection,  colostomy, diverting urostomy, kidney cancer status post nephrectomy, liver cirrhosis with recurrent ascites and paracentesis, hepatitis B, HIV/AIDS, type 2 diabetes, hypertension, chronic pain who was just recently discharged from [facility] but fell in his home and injured his right knee.  X-ray showed transverse comminuted impacted fracture of the proximal tibial metaphysis.  Because of his significant amount of pain, he was found to have compartment syndrome and he was taken to the OR emergently by orthopedic surgeon and underwent fasciotomy of the right lower leg on 4/6/17.  He then underwent irrigation and debridement with wound reclosure of the right leg on 4/7/17.  The patient suffered from acute blood loss anemia and received 1 unit packed RBC perioperatively.  On the day prior to his discharge he developed some bleeding at the junction of colostomy site which resolved with pressure and placement of a new pouch.  He was seen by ostomy nurse.  He has not had any recurrence of his bleeding.    Currently, he states that he still has a lot of pain on his right leg despite getting a fentanyl patch and oxycodone.  Otherwise he states that he is sleeping better and also eating okay and denies any nausea or vomiting, abdominal pain, diarrhea or constipation.  He also denies any fevers or chills, chest pains or shortness of breath.  He states that he still feels weak after his surgery but he is able to participate in physical therapy despite his pain.  His hemoglobin is still low at 7.7, electrolytes normal, NH 4 is high at 59.    Other review of systems are negative.      PAST MEDICAL/SURGICAL HISTORY:  Past Medical History:   Diagnosis Date     AIDS (acquired immune deficiency syndrome)      Cancer     History of Kidney & Rectal Cancer     Chronic renal failure      Cirrhosis      Colostomy in place      Diabetes mellitus     Type II     History of urinary tract infection      Hx of fever       Hydronephrosis      Hypertension      Ileostomy in place      Lower GI bleed      Thrombocytopenia      Past Surgical History:   Procedure Laterality Date     COLON SURGERY       COLOSTOMY Left      FASCIOTOMY Right 4/6/2017    Procedure: FASCIOTOMY RIGHT LOWER LEG;  Surgeon: Quentin Landis MD;  Location: [facility];  Service:      FRACTURE SURGERY Right     right knee and right ankle surgery     ILEOSTOMY Right      PICC  6/18/2015            SOCIAL HISTORY: She is single and lives alone in his own apartment, he has been in a relationship with his partner for 30 years although they are not living together currently.  He is a smoker, one fourth pack per day for 30 years although he states that his last cigarette was 3 months ago.  He quit alcohol many years ago.      MEDICATIONS:  Reviewed per TCU orders summary    ALLERGIES:  Allergies   Allergen Reactions     Compazine [Prochlorperazine]      Parkinson's type symptoms     Sulfacetamide Other (See Comments)     Patient can't remember         PHYSICAL EXAMINATION:  Vital signs 125/91, 96.9, 75, 16, 96%  General: Awake, Alert, oriented x3, not in any form of acute distress, answers questions appropriately, follows simple commands, conversant, pale, has a sad affect  HEENT: Pale conjunctiva, anicteric sclerae, oral mucosa is moist, oral thrush noted, no upper or lower teeth   NECK: Supple, without any lymphadenopathy, thyromegaly or any masses  LUNG: Clear to auscultation, good chest expansion. There are no crackles, no wheezes, normal AP diameter  BACK: No kyphosis of the thoracic spine  CVS: There is good S1  S2, there are no murmurs, no heaves, rhythm is regular  ABDOMEN: Globular with ascites, soft, nontender to palpation, no organomegaly, good bowel sounds, positive colostomy, site is clean, positive ileostomy conduit with clear urine per catheter  EXTREMITIES: Good range of motion on both upper and lower extremities except on right lower extremity  which is wrapped, left leg with trace pedal edema, no cyanosis or clubbing, no calf tenderness, right arm AV fistula with good bruits  SKIN: Warm and dry, no rashes or erythema noted    LABS:  Lab Results   Component Value Date    WBC 1.9 (LL) 04/12/2017    HGB 8.2 (L) 04/12/2017    HCT 23.8 (L) 04/12/2017    MCV 96 04/12/2017    PLT 93 (L) 04/12/2017     Lab Results   Component Value Date    CREATININE 3.88 (H) 04/12/2017    BUN 64 (H) 04/12/2017     04/12/2017    K 5.0 04/12/2017     (H) 04/12/2017    CO2 16 (L) 04/12/2017           35 minutes of total time spent, greater than 55% of the time spent in coordination of care and counseling regarding the above medical issues and plan of care. I have reviewed the patient's medical records, labs and medications.       Electronically signed by:Zeynep Mijares MD

## 2021-06-10 NOTE — PROGRESS NOTES
[facility]        Visit Type: Review Of Multiple Medical Conditions (Liver cirrhosis, CRD, type 2 diabetes)    Code Status:  FULL CODE  Facility:  [facility] [[id number]]          PCP: No Primary Care Provider       PHONE: None     FAX:[phone]        ASSESSMENT/PLAN:  1. Compartment syndrome, traumatic, lower extremity     2. CRD (chronic renal disease), stage 5     3. Liver cirrhosis     4. Type 2 diabetes mellitus     5. Chronic pain     6. Acute blood loss anemia     7. Essential hypertension     8. AIDS       1. Compartment syndrome, traumatic, lower extremity   status post fasciotomy and incision and drainage of wound, also has a right tibial fracture now with intermittent pain.  A long discussion was done with the patient regarding his pain medications.  He states that he does not want his pain medications to be increased but that he wants them to be given on time when he asks for them.  He is on a fentanyl patch and also on 10-15 mg every 4 hours oxycodone.  We did discuss risks and benefits of narcotic use.     2. Acute blood loss anemia   hemoglobin low at 7.7, no evidence of ongoing blood loss.  Check hemogram on 4/24, follow-up with renal MD for possible EPO shots.  Consider blood transfusion if hemoglobin less than 7    3. CRD (chronic renal disease), stage 5   will check BMP on 4/24.     4. Liver cirrhosis   LFTs normal except for low albumin, INR 1.2, NH 4 at 59.  We started lactulose 15 cc daily and recheck NH4    5. AIDS   stable on ART    6. Type 2 diabetes mellitus   check hemoglobin A1c, blood sugars range of 74 -152.  Long discussion was done regarding diabetes.  Will decrease Lantus to 10 units daily and change NovoLog sliding scale as follows, less than 180 no insulin sliding scale, 1  units, 221-263 units, 2  units, 301-345 units, 3  units, greater than 408 units.  Hold NovoLog sliding scale for  blood sugars less than 100    7. Chronic pain   as above discussed pain management    8. Essential hypertension   blood pressures are all stable on torsemide, electrolytes normal          HISTORY OF PRESENT ILLNESS:   Hosea Garrison is a 56 y.o. male with a history of anal squamous cell cancer status post resection, colostomy, diverting urostomy, kidney cancer status post nephrectomy, liver cirrhosis with recurrent ascites and paracentesis, hepatitis B, HIV/AIDS, type 2 diabetes, hypertension, chronic pain who was just recently discharged from [facility] but fell in his home and injured his right knee.  X-ray showed transverse comminuted impacted fracture of the proximal tibial metaphysis.  Because of his significant amount of pain, he was found to have compartment syndrome and he was taken to the OR emergently by orthopedic surgeon and underwent fasciotomy of the right lower leg on 4/6/17.  He then underwent irrigation and debridement with wound reclosure of the right leg on 4/7/17.  The patient suffered from acute blood loss anemia and received 1 unit packed RBC perioperatively.  On the day prior to his discharge he developed some bleeding at the junction of colostomy site which resolved with pressure and placement of a new pouch.  He was seen by ostomy nurse.  He has not had any recurrence of his bleeding.    Currently, he states that he still has a lot of pain on his right leg despite getting a fentanyl patch and oxycodone but states that occasionally oxycodone does not work despite big doses.  Otherwise he states that he is sleeping better and also eating okay and denies any nausea or vomiting, abdominal pain, diarrhea despite being on lactulose.  He also denies any fevers or chills, chest pains or shortness of breath.  He states that he still feels weak after his surgery but he is able to participate in physical therapy despite his pain.  His hemoglobin is still low at 7.7, electrolytes normal, NH 4 is  high at 59 and lactulose was started.  He was also noted now to have low blood sugars in the morning in the range of 74-85.  He is on Lantus and NovoLog sliding scale.    Other review of systems are negative.      PAST MEDICAL/SURGICAL HISTORY:  Past Medical History:   Diagnosis Date     AIDS (acquired immune deficiency syndrome)      Cancer     History of Kidney & Rectal Cancer     Chronic renal failure      Cirrhosis      Colostomy in place      Diabetes mellitus     Type II     History of urinary tract infection      Hx of fever      Hydronephrosis      Hypertension      Ileostomy in place      Lower GI bleed      Thrombocytopenia      Past Surgical History:   Procedure Laterality Date     COLON SURGERY       COLOSTOMY Left      FASCIOTOMY Right 4/6/2017    Procedure: FASCIOTOMY RIGHT LOWER LEG;  Surgeon: Quentin Landis MD;  Location: [facility];  Service:      FRACTURE SURGERY Right     right knee and right ankle surgery     ILEOSTOMY Right      PICC  6/18/2015            SOCIAL HISTORY: She is single and lives alone in his own apartment, he has been in a relationship with his partner for 30 years although they are not living together currently.  He is a smoker, one fourth pack per day for 30 years although he states that his last cigarette was 3 months ago.  He quit alcohol many years ago.      MEDICATIONS:  Reviewed per TCU orders summary    ALLERGIES:  Allergies   Allergen Reactions     Compazine [Prochlorperazine]      Parkinson's type symptoms     Sulfacetamide Other (See Comments)     Patient can't remember         PHYSICAL EXAMINATION:  Vital signs 116/60, 97.7, 78, 18, 97%  General: Awake, Alert, oriented x3, not in any form of acute distress, answers questions appropriately, follows simple commands, conversant, pale, has a sad affect  HEENT: Pale conjunctiva, anicteric sclerae, oral mucosa is moist, oral thrush noted, no upper or lower teeth   NECK: Supple, without any lymphadenopathy,  thyromegaly or any masses  LUNG: Clear to auscultation, good chest expansion. There are no crackles, no wheezes, normal AP diameter  BACK: No kyphosis of the thoracic spine  CVS: There is good S1  S2, there are no murmurs, no heaves, rhythm is regular  ABDOMEN: Globular with ascites, soft, nontender to palpation, no organomegaly, good bowel sounds, positive colostomy, site is clean, positive ileostomy conduit with clear urine per catheter  EXTREMITIES: Good range of motion on both upper and lower extremities except on right lower extremity which is wrapped, left leg with trace pedal edema, no cyanosis or clubbing, no calf tenderness, right arm AV fistula with good bruits  SKIN: Warm and dry, no rashes or erythema noted    LABS:  Lab Results   Component Value Date    WBC 1.9 (LL) 04/12/2017    HGB 8.2 (L) 04/12/2017    HCT 23.8 (L) 04/12/2017    MCV 96 04/12/2017    PLT 93 (L) 04/12/2017     Lab Results   Component Value Date    CREATININE 3.88 (H) 04/12/2017    BUN 64 (H) 04/12/2017     04/12/2017    K 5.0 04/12/2017     (H) 04/12/2017    CO2 16 (L) 04/12/2017           35 minutes of total time spent, greater than 55% of the time spent in coordination of care and counseling regarding the above medical issues and plan of care. I have reviewed the patient's medical records, labs and medications.       Electronically signed by:Zeynep Mijares MD

## 2021-06-10 NOTE — PROGRESS NOTES
[facility]    DATE: 2017    NAME:  Hosea Garrison             :  1960  MRN: [id number]  CODE STATUS:  FULL CODE    FACILITY:  [facility] [[id number]]       ROOM:   [id number]    CHIEF COMPLAINT/REASON FOR VISIT:  Chief Complaint   Patient presents with     Problem Visit     Ss/p Fasciotomy incision and drainage, ABL, and AIDs     HISTORY OF PRESENT ILLNESS: Hosea Garrison is a 56 y.o. 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  X-ray showed transverse comminuted impacted fracture of the proximal tibial metaphysis.  Because of his significant amount of pain, he was found to have compartment syndrome and was taken to the OR emergently by the orthopedic surgeon and underwent fasciotomy of the right lower leg on 17.  He then underwent irrigation and debridement with wound reclosure of the right leg on 17.  The patient suffered from acute blood loss anemia and received 1 unit packed RBC, perioperatively.  On the day prior to his discharge he developed some bleeding at the junction of his colostomy site which resolved with pressure and placement of a new pouch.  He was seen by the ostomy nurse.  He has not had any recurrence of his bleeding.    Today, patient states that his pain is improving with the Fentanyl patch and Oxycodone but states that occasionally oxycodone does not work despite big doses. He is on Oxycodone 10-15 mg 4 times daily as needed.  States that his lethargy and sleepiness has improved after increasing lactulose especially during the day.  His NH 4 is higher at 166 from 63 and his GFR is at 12.  He also has Pancytopenia with  Hemoglobin at 8.3, WBC 2.2, and Platelet 92.  His last renal MD appointment with recommendations is to increase Torsemide to 20 mg twice daily, completed Metolazone 5 mg daily for 5 days, low-sodium diet, daily weights, and 1.5 L water restriction.   Anemia - He was started on Aranesp.  He states that he still feels weak after his surgery but he is able to participate in physical therapy despite his pain.      Past Medical History:   Diagnosis Date     AIDS (acquired immune deficiency syndrome)      Cancer     History of Kidney & Rectal Cancer     Chronic renal failure      Cirrhosis      Colostomy in place      Diabetes mellitus     Type II     History of urinary tract infection      Hx of fever      Hydronephrosis      Hypertension      Ileostomy in place      Lower GI bleed      Thrombocytopenia      Past Surgical History:   Procedure Laterality Date     COLON SURGERY       COLOSTOMY Left      FASCIOTOMY Right 4/6/2017    Procedure: FASCIOTOMY RIGHT LOWER LEG;  Surgeon: Quentin Landis MD;  Location: [facility];  Service:      FRACTURE SURGERY Right     right knee and right ankle surgery     ILEOSTOMY Right      PICC  6/18/2015          No family history on file.     Social History     Social History     Marital status: Single     Spouse name: N/A     Number of children: N/A     Years of education: N/A     Occupational History     Not on file.     Social History Main Topics     Smoking status: Current Every Day Smoker     Packs/day: 0.25     Years: 30.00     Smokeless tobacco: Not on file     Alcohol use No      Comment: patient reports very rarely     Drug use: No      Comment: denies     Sexual activity: No     Other Topics Concern     Not on file     Social History Narrative     Allergies   Allergen Reactions     Compazine [Prochlorperazine]      Parkinson's type symptoms     Sulfacetamide Other (See Comments)     Patient can't remember     Current Outpatient Prescriptions   Medication Sig Dispense  Refill     abacavir (ZIAGEN) 300 mg tablet Take 300 mg by mouth 2 (two) times a day.       acetaminophen (TYLENOL) 500 MG tablet Take 2 tablets (1,000 mg total) by mouth 3 (three) times a day as needed for pain. 40 tablet 0     albuterol (PROVENTIL HFA;VENTOLIN HFA) 90 mcg/actuation inhaler Inhale 2 puffs every 4 (four) hours as needed for wheezing.       aspirin 325 MG tablet Take 1 tablet (325 mg total) by mouth 2 (two) times a day. 60 tablet 0     citalopram (CELEXA) 40 MG tablet Take 40 mg by mouth daily.       citric acid-sodium citrate (BICITRA) 500-334 mg/5 mL solution Take 30 mL by mouth 2 (two) times a day. For Chronic metabolic acidosis       ergocalciferol (ERGOCALCIFEROL) 50,000 unit capsule Take 50,000 Units by mouth once a week. Every Saturday       fenofibrate (TRICOR) 48 MG tablet Take 48 mg by mouth daily.       fentaNYL (DURAGESIC) 50 mcg/hr Place 1 patch on the skin every third day. 5 patch 0     ferrous sulfate 325 (65 FE) MG tablet Take 1 tablet by mouth 2 (two) times a day.        gabapentin (NEURONTIN) 300 MG capsule Take 300 mg by mouth bedtime.       insulin detemir (LEVEMIR) 100 unit/mL injection Inject 12 Units under the skin at bedtime.       lactulose 10 gram/15 mL (15 mL) Soln Take 30 mL by mouth 3 (three) times a day.        lamiVUDine (EPIVIR) 100 MG tablet Take 1 tablet (100 mg total) by mouth daily.       loratadine (CLARITIN) 10 mg tablet Take 10 mg by mouth every other day.        LORazepam (ATIVAN) 0.5 MG tablet Take 1 tablet (0.5 mg total) by mouth 2 (two) times a day as needed for anxiety. 30 tablet 0     naphazoline-pheniramine (NAPHCON-A) 0.025-0.3 % ophthalmic solution Administer 1 drop to both eyes every 6 (six) hours as needed.       nevirapine (VIRAMUNE) 200 mg tablet Take 200 mg by mouth 2 (two) times a day.       nicotine polacrilex (COMMIT) 4 MG lozenge Apply 4 mg to the mouth or throat. 1 q 1-2hours (max 5/6hrs, or 20/24hrs).  No chew,cut,crush. Avoid 15 ac or pc.. X  6 weeks.       NOVOLOG 100 unit/mL injection Check blood sugar three (3) times daily.  11.9 Type 2 without complications 10 mL PRN     omeprazole (PRILOSEC) 20 MG capsule Take 20 mg by mouth Daily before breakfast.       ondansetron (ZOFRAN) 4 MG tablet Take 4 mg by mouth every 8 (eight) hours as needed for nausea.        oxyCODONE 10 mg Tab Take 10-15 mg by mouth every 4 (four) hours as needed for pain. 70 tablet 0     QUEtiapine (SEROQUEL) 50 MG tablet Take 50 mg by mouth bedtime.       rifAXIMin (XIFAXAN) 550 mg Tab tablet Take 550 mg by mouth 2 (two) times a day.       senna-docusate (PERICOLACE) 8.6-50 mg tablet Take 1 tablet by mouth daily as needed for constipation.       simethicone (MYLICON) 80 MG chewable tablet Chew 80 mg every 6 (six) hours as needed for flatulence.       sodium bicarbonate 650 MG tablet Take 1 tablet (650 mg total) by mouth 3 (three) times a day.  0     torsemide (DEMADEX) 20 MG tablet Take 20 mg by mouth 2 (two) times a day.       No current facility-administered medications for this visit.      REVIEW OF SYSTEMS    Currently, no fever, chills, or rigors.  He does not have any visual or hearing problems. He denies any chest pain, headaches, palpitations, lightheadedness, dizziness, shortness of breath, or cough. His appetite is good, he denies any GERD symptoms, he denies any difficulty with swallowing, nausea, or vomiting.  He denies any abdominal pain, diarrhea or constipation. He denies any urinary symptoms. No insomnia. No active bleeding. No rash.     PHYSICAL EXAMINATION  Vitals:    05/30/17 2100   BP: 128/72   Pulse: 87   Resp: 19   Temp: 96.6  F (35.9  C)   SpO2: 99%   Weight: 163 lb 8 oz (74.2 kg)     General: Alert, oriented x3, not in any form of acute distress, answers questions appropriately, follows simple commands, less conversant, pale, has a sad affect  HEENT: Pale conjunctiva, anicteric sclerae, oral mucosa is moist, oral thrush resolved, no upper or lower teeth    NECK: Supple, without any lymphadenopathy, thyromegaly or any masses  BACK: No kyphosis of the thoracic spine  ABDOMEN: Globular with mildly increased abdominal girth/ascites, soft, nontender to palpation, no organomegaly,  colostomy, site is clean, ileostomy conduit with clear urine per catheter  EXTREMITIES: Good range of motion on both upper and lower extremities except on right lower extremity which is wrapped in a long leg cast, able to wiggle right toes, left leg with 1-2+ pedal edema, no cyanosis or clubbing, no calf tenderness, right arm AV fistula with good bruits  SKIN: Warm and dry, no rashes or erythema noted    LABS:    Lab Results   Component Value Date    WBC 1.9 (LL) 04/12/2017    HGB 8.2 (L) 04/12/2017    HCT 23.8 (L) 04/12/2017    MCV 96 04/12/2017    PLT 93 (L) 04/12/2017     Results for orders placed or performed during the hospital encounter of 04/05/17   Basic Metabolic Panel   Result Value Ref Range    Sodium 135 (L) 136 - 145 mmol/L    Potassium 4.0 3.5 - 5.0 mmol/L    Chloride 107 98 - 107 mmol/L    CO2 17 (L) 22 - 31 mmol/L    Anion Gap, Calculation 11 5 - 18 mmol/L    Glucose 119 70 - 125 mg/dL    Calcium 7.6 (L) 8.5 - 10.5 mg/dL    BUN 73 (H) 8 - 22 mg/dL    Creatinine 4.32 (H) 0.70 - 1.30 mg/dL    GFR MDRD Af Amer 17 (L) >60 mL/min/1.73m2    GFR MDRD Non Af Amer 14 (L) >60 mL/min/1.73m2     Lab Results   Component Value Date    HGBA1C 5.7 08/13/2016     Vitamin D, Total (25-Hydroxy)   Date Value Ref Range Status   12/30/2016 37.0 30.0 - 80.0 ng/mL Final       ASSESSMENT/PLAN:    1. Traumatic compartment syndrome of right lower extremity, initial encounter - status post fasciotomy and incision and drainage of wound, also has a right tibial fracture now with intermittent pain.  He is on a Fentanyl patch and Oxycodone to 10-15 mg TID hours as needed.  Ortho follow up done and he could do partial weightbearing up to 50% of body weight.  He will need further rehab as he has 3 flights of  steps to navigate throughout his home.  Patient now has a hinged knee brace.     2. Colostomy in place - Staff to change due to NWB status, as needed   3. Anemia - Completed iron infusion, i.e. Venofer × 2 doses.  Now with Pancytopenia, Hemoglobin low at 8.3 from 7.6, and no evidence of ongoing blood loss.  Pancytopenia is multifactorial with acute blood loss anemia from surgery, CRD stage V, Liver cirrhosis, and HIV/AIDS.  Aranesp has been started by renal MD.  He also    4. Essential hypertension - Blood pressures within target range   5. CRD (chronic renal disease), stage 5 - GFR is lower at 12 from 17 with Creatinine at 4.84 from 3.81, and BUN 81 from 60.  Will follow multiple recommendations from renal MD. Weight has decreased by 3 pounds after aggressive diuresis.  He is now off Metolazone and    6. Liver cirrhosis - LFTs normal except for low albumin, INR 1.2, NH 4 at 161 from 63.  We increased lactulose 45 mL 3 times daily, no hepatic encephalopathy.  Will check abdominal girth 2 times a week, await NH 4 results    7. AIDS - Stable on ART, we will need to make sure the patient has a 6 month follow-up with ID    8. Type 2 diabetes mellitus - Blood sugars range of 106-200.   We increased Lantus to 12 units daily, continue NovoLog sliding scale    9. Chronic pain syndrome - Will continue current pain medication regimen   10. Ileostomy - Staff to change, as needed           Electronically signed by:  Maty Mcknight CNP    35 minutes TT of which 50% was spent in counseling and coordination of care of the above plan.    Time spent in interview and examination of patient, review of available records, and discussion with nursing staff. Continue care plan, efforts at therapy, and monitor nutritional status.

## 2021-06-10 NOTE — PROGRESS NOTES
[facility]        Visit Type: Review Of Multiple Medical Conditions (Liver cirrhosis, type 2 diabetes, CRD)    Code Status:  FULL CODE  Facility:  ANSWER ROOMING ACTIVITY QUESTION          PCP: No Primary Care Provider       PHONE: None     FAX:[phone]        ASSESSMENT/PLAN:  1. Liver cirrhosis     2. CRD (chronic renal disease), stage 5     3. Type 2 diabetes mellitus     4. Chronic pain     5. Compartment syndrome, traumatic, lower extremity     6. Essential hypertension     7. AIDS     8. Pancytopenia       1. Compartment syndrome, traumatic, lower extremity   status post fasciotomy and incision and drainage of wound, also has a right tibial fracture now with intermittent pain.  A long discussion was done with the patient regarding his pain medications.  He is on a fentanyl patch and we will decrease oxycodone to 10-15 mg every 6 hours as needed.  We did discuss risks and benefits of narcotic use.  Ortho follow up done and he could do partial weightbearing up to 50% of body weight.  He will need further rehab as he has 3 flights of steps to navigate when he goes home.  Patient is now on a long leg cast was supposed to return  in 5 weeks time for cast removal and placement of a hinged knee brace but was seen again by orthopedics since he wants to go home soon but he has a lot of stairs in his home.  Per orthopedics, he can do some toe touching, partial weightbearing up to 50% of body weight.     2. Acute blood loss anemia   now with pancytopenia, hemoglobin low at 8.3 from 7.6, no evidence of ongoing blood loss.  Pancytopenia is multifactorial with acute blood loss anemia from surgery, CRD stage V, liver cirrhosis, HIV/AIDS.  Aranesp has been started by renal MD and he seen this week for another injection.  He also completed iron infusion, i.e. Venofer ×2 doses    3. CRD (chronic renal disease), stage 5   GFR is lower at 12 from 17 with creatinine at 4.84  from 3.81, and BUN 81 from 60.  Will follow multiple recommendations from renal MD. his weight has decreased by 3 pounds after aggressive diuresis.  He is now off metolazone and we would expect GFR to increase    4. Liver cirrhosis   LFTs normal except for low albumin, INR 1.2, NH 4 at 161 from 63.  We increased lactulose 45 mL 3 times daily, no hepatic encephalopathy    5. AIDS   stable on ART, we will need to make sure the patient has a 6 month follow-up with ID    6. Type 2 diabetes mellitus   check hemoglobin A1c, blood sugars range of 106-180, blood sugars slightly higher with decreasing Lantus to 10 units daily and changing NovoLog sliding scale as follows, less than 180 no insulin sliding scale, 1  units, 221-263 units, 2  units, 301-345 units, 3  units, greater than 408 units.  Hold NovoLog sliding scale for blood sugars less than 100.  We increased Lantus to 12 units daily    7. Chronic pain   as above discussed pain management extensively   8. Essential hypertension   blood pressures are all stable          HISTORY OF PRESENT ILLNESS:   Hosea Garrison is a 56 y.o. male with a history of anal squamous cell cancer status post resection, colostomy, diverting urostomy, kidney cancer status post nephrectomy, liver cirrhosis with recurrent ascites and paracentesis, hepatitis B, HIV/AIDS, type 2 diabetes, hypertension, chronic pain who was just recently discharged from [facility] but fell in his home and injured his right knee.  X-ray showed transverse comminuted impacted fracture of the proximal tibial metaphysis.  Because of his significant amount of pain, he was found to have compartment syndrome and he was taken to the OR emergently by orthopedic surgeon and underwent fasciotomy of the right lower leg on 4/6/17.  He then underwent irrigation and debridement with wound reclosure of the right leg on 4/7/17.  The patient suffered from acute blood loss anemia and received 1 unit packed  RBC perioperatively.  On the day prior to his discharge he developed some bleeding at the junction of colostomy site which resolved with pressure and placement of a new pouch.  He was seen by ostomy nurse.  He has not had any recurrence of his bleeding.    Currently, he states that his pain on his right leg is improving on a fentanyl patch and oxycodone but states that occasionally oxycodone does not work despite big doses.  A long discussion was done regarding decreasing his pain medications and he reluctantly agreed to change frequency of chronic use.  States that his lethargy and sleepiness has improved after increasing lactulose especially during the day.  His NH 4 is higher at 166 from 63 and his GFR is at 12.  He also has pancytopenia with hemoglobin at 8.3, WBC 2.2, platelet 92.  He was seen by renal MD and recommendation is to increase torsemide to 20 mg twice daily, plated metolazone 5 mg daily for 5 days, low-sodium diet and daily weights and 1.5 L water restriction.  His weight has decreased by about 3 pounds.  He was also started on aranesp and had another injection next week.  He denies any nausea or vomiting, abdominal pain..  He also denies any fevers or chills, chest pains or shortness of breath.  He states that he still feels weak after his surgery but he is able to participate in physical therapy despite his pain.  His blood sugars have been high after we have decreased Lantus but he does not have any hypo-glycemic events.  He is on Lantus and NovoLog sliding scale.    Other review of systems are negative.      PAST MEDICAL/SURGICAL HISTORY:  Past Medical History:   Diagnosis Date     AIDS (acquired immune deficiency syndrome)      Cancer     History of Kidney & Rectal Cancer     Chronic renal failure      Cirrhosis      Colostomy in place      Diabetes mellitus     Type II     History of urinary tract infection      Hx of fever      Hydronephrosis      Hypertension      Ileostomy in place       Lower GI bleed      Thrombocytopenia      Past Surgical History:   Procedure Laterality Date     COLON SURGERY       COLOSTOMY Left      FASCIOTOMY Right 4/6/2017    Procedure: FASCIOTOMY RIGHT LOWER LEG;  Surgeon: Quentin Landis MD;  Location: [facility];  Service:      FRACTURE SURGERY Right     right knee and right ankle surgery     ILEOSTOMY Right      PICC  6/18/2015            SOCIAL HISTORY: He is single and lives alone in his own apartment, he has been in a relationship with his partner for 30 years although they are not living together currently.  He is a smoker, one fourth pack per day for 30 years although he states that his last cigarette was 3 months ago.  He quit alcohol many years ago.      MEDICATIONS:  Reviewed per TCU orders summary    ALLERGIES:  Allergies   Allergen Reactions     Compazine [Prochlorperazine]      Parkinson's type symptoms     Sulfacetamide Other (See Comments)     Patient can't remember         PHYSICAL EXAMINATION:  Vital signs 107/53, 97.6, 77, 15, 98.2  General: Alert, oriented x3, not in any form of acute distress, answers questions appropriately, follows simple commands, less conversant, pale, has a sad affect  HEENT: Pale conjunctiva, anicteric sclerae, oral mucosa is moist, oral thrush resolved, no upper or lower teeth   NECK: Supple, without any lymphadenopathy, thyromegaly or any masses  BACK: No kyphosis of the thoracic spine  ABDOMEN: Globular with ascites, soft, nontender to palpation, no organomegaly,  colostomy, site is clean, ileostomy conduit with clear urine per catheter  EXTREMITIES: Good range of motion on both upper and lower extremities except on right lower extremity which is wrapped in a long leg cast, able to wiggle right toes, left leg with trace pedal edema, no cyanosis or clubbing, no calf tenderness, right arm AV fistula with good bruits  SKIN: Warm and dry, no rashes or erythema noted    LABS:  Lab Results   Component Value Date    WBC 1.9  (LL) 04/12/2017    HGB 8.2 (L) 04/12/2017    HCT 23.8 (L) 04/12/2017    MCV 96 04/12/2017    PLT 93 (L) 04/12/2017     Lab Results   Component Value Date    CREATININE 3.88 (H) 04/12/2017    BUN 64 (H) 04/12/2017     04/12/2017    K 5.0 04/12/2017     (H) 04/12/2017    CO2 16 (L) 04/12/2017       35 minutes TT spent, >50% in coordination of care and counseling regarding the above medical issues and plan of care.  A long discussion was done with patient and nursing staff regarding above problems.  I reviewed medications/labs/available medical records.    Electronically signed by:Zeynep Mijares MD

## 2021-06-10 NOTE — PROGRESS NOTES
[facility]        Visit Type: Review Of Multiple Medical Conditions (Lethargy, type 2 diabetes, CRD, liver cirrhosis)    Code Status:  FULL CODE  Facility:  [facility] [[id number]]          PCP: No Primary Care Provider       PHONE: None     FAX:[phone]        ASSESSMENT/PLAN:  1. Lethargy     2. Compartment syndrome, traumatic, lower extremity     3. Liver cirrhosis     4. CRD (chronic renal disease), stage 5     5. Type 2 diabetes mellitus     6. Chronic pain     7. Acute blood loss anemia     8. AIDS     9. Essential hypertension       1. Compartment syndrome, traumatic, lower extremity   status post fasciotomy and incision and drainage of wound, also has a right tibial fracture now with intermittent pain.  A long discussion was done with the patient regarding his pain medications.  He states that he does not want his pain medications to be increased but that he wants them to be given on time when he asks for them.  He is on a fentanyl patch and also on 10-15 mg every 4 hours oxycodone.  We did discuss risks and benefits of narcotic use.  Ortho follow up done.  Patient is now on a long leg cast and will return to or so in 5 weeks time for cast removal and placement of a hinged knee brace    2. Acute blood loss anemia   now with pancytopenia, hemoglobin low at 7.6, no evidence of ongoing blood loss.  Pancytopenia is multifactorial with acute blood loss anemia from surgery, CRD stage V, liver cirrhosis, HIV/AIDS.  Aranesp has been started by renal MD    3. CRD (chronic renal disease), stage 5   GFR is lower at 12 from 17 with creatinine at 4.84 from 3.81, and BUN 81 from 60.  Will follow multiple recommendations from renal MD.     4. Liver cirrhosis   LFTs normal except for low albumin, INR 1.2, NH 4 at 76 from 63.  Give lactulose 45 mL in a.m., 30 mL twice daily    5. AIDS   stable on ART, we will need to make sure the patient has a 6  month follow-up with ID    6. Type 2 diabetes mellitus   check hemoglobin A1c, blood sugars range of 99/177, blood sugars improved with decreasing Lantus to 10 units daily and changing NovoLog sliding scale as follows, less than 180 no insulin sliding scale, 1  units, 221-263 units, 2  units, 301-345 units, 3  units, greater than 408 units.  Hold NovoLog sliding scale for blood sugars less than 100    7. Chronic pain   as above discussed pain management    8. Essential hypertension   blood pressures are all stable          HISTORY OF PRESENT ILLNESS:   Hosea Garrison is a 56 y.o. male with a history of anal squamous cell cancer status post resection, colostomy, diverting urostomy, kidney cancer status post nephrectomy, liver cirrhosis with recurrent ascites and paracentesis, hepatitis B, HIV/AIDS, type 2 diabetes, hypertension, chronic pain who was just recently discharged from [facility] but fell in his home and injured his right knee.  X-ray showed transverse comminuted impacted fracture of the proximal tibial metaphysis.  Because of his significant amount of pain, he was found to have compartment syndrome and he was taken to the OR emergently by orthopedic surgeon and underwent fasciotomy of the right lower leg on 4/6/17.  He then underwent irrigation and debridement with wound reclosure of the right leg on 4/7/17.  The patient suffered from acute blood loss anemia and received 1 unit packed RBC perioperatively.  On the day prior to his discharge he developed some bleeding at the junction of colostomy site which resolved with pressure and placement of a new pouch.  He was seen by ostomy nurse.  He has not had any recurrence of his bleeding.    Currently, he states that he still has a lot of pain on his right leg despite getting a fentanyl patch and oxycodone but states that occasionally oxycodone does not work despite big doses.  States that he is more sleepy and lethargic during the  day.  His NH 4 is higher at 76 from 63 and his GFR is at 12.  He also has pancytopenia with hemoglobin at 7.6, WBC 1.8, platelet 104.  He was seen by renal MD and recommendation is to increase torsemide to 20 mg twice daily, start metoprolol 5 mg daily for 5 days, low-sodium diet and daily weights and 1.5 L water restriction.  He was also started on aranesp.  He denies any nausea or vomiting, abdominal pain..  He also denies any fevers or chills, chest pains or shortness of breath.  He states that he still feels weak after his surgery but he is able to participate in physical therapy despite his pain.  His blood sugars have improved after we have decreased Lantus and he does not have any hypo-glycemic events.  He is on Lantus and NovoLog sliding scale.    Other review of systems are negative.      PAST MEDICAL/SURGICAL HISTORY:  Past Medical History:   Diagnosis Date     AIDS (acquired immune deficiency syndrome)      Cancer     History of Kidney & Rectal Cancer     Chronic renal failure      Cirrhosis      Colostomy in place      Diabetes mellitus     Type II     History of urinary tract infection      Hx of fever      Hydronephrosis      Hypertension      Ileostomy in place      Lower GI bleed      Thrombocytopenia      Past Surgical History:   Procedure Laterality Date     COLON SURGERY       COLOSTOMY Left      FASCIOTOMY Right 4/6/2017    Procedure: FASCIOTOMY RIGHT LOWER LEG;  Surgeon: Quentin Landis MD;  Location: [facility];  Service:      FRACTURE SURGERY Right     right knee and right ankle surgery     ILEOSTOMY Right      PICC  6/18/2015            SOCIAL HISTORY: He is single and lives alone in his own apartment, he has been in a relationship with his partner for 30 years although they are not living together currently.  He is a smoker, one fourth pack per day for 30 years although he states that his last cigarette was 3 months ago.  He quit alcohol many years ago.      MEDICATIONS:  Reviewed  per TCU orders summary    ALLERGIES:  Allergies   Allergen Reactions     Compazine [Prochlorperazine]      Parkinson's type symptoms     Sulfacetamide Other (See Comments)     Patient can't remember         PHYSICAL EXAMINATION:  Vital signs 117/67, 97.6, 71, 18, 99% room air  General: Slightly sleepy, oriented x3, not in any form of acute distress, answers questions appropriately, follows simple commands, less conversant, pale, has a sad affect  HEENT: Pale conjunctiva, anicteric sclerae, oral mucosa is moist, oral thrush resolved, no upper or lower teeth   NECK: Supple, without any lymphadenopathy, thyromegaly or any masses  LUNG: Clear to auscultation, good chest expansion. There are no crackles, no wheezes, normal AP diameter  BACK: No kyphosis of the thoracic spine  CVS: There is good S1  S2, there are no murmurs, no heaves, rhythm is regular  ABDOMEN: Globular with ascites, soft, nontender to palpation, no organomegaly, good bowel sounds, positive colostomy, site is clean, positive ileostomy conduit with clear urine per catheter  EXTREMITIES: Good range of motion on both upper and lower extremities except on right lower extremity which is wrapped in a long leg cast, able to wiggle right toes, left leg with trace pedal edema, no cyanosis or clubbing, no calf tenderness, right arm AV fistula with good bruits  SKIN: Warm and dry, no rashes or erythema noted    LABS:  Lab Results   Component Value Date    WBC 1.9 (LL) 04/12/2017    HGB 8.2 (L) 04/12/2017    HCT 23.8 (L) 04/12/2017    MCV 96 04/12/2017    PLT 93 (L) 04/12/2017     Lab Results   Component Value Date    CREATININE 3.88 (H) 04/12/2017    BUN 64 (H) 04/12/2017     04/12/2017    K 5.0 04/12/2017     (H) 04/12/2017    CO2 16 (L) 04/12/2017           35 minutes of total time spent, greater than 55% of the time spent in coordination of care and counseling regarding the above medical issues and plan of care. I have reviewed the patient's medical  records, labs and medications.       Electronically signed by:Zeynep Mijares MD

## 2021-06-10 NOTE — PROGRESS NOTES
[facility]        Visit Type: Review Of Multiple Medical Conditions (End-stage renal disease, type 2 diabetes, liver cirrhosis)    Code Status:  FULL CODE  Facility:  [facility] [[id number]]          PCP: No Primary Care Provider       PHONE: None     FAX:[phone]        ASSESSMENT/PLAN:  1. Liver cirrhosis     2. CRD (chronic renal disease), stage 5     3. Type 2 diabetes mellitus     4. Chronic pain     5. Compartment syndrome, traumatic, lower extremity     6. Essential hypertension     7. AIDS     8. Pancytopenia       1. Compartment syndrome, traumatic, lower extremity   status post fasciotomy and incision and drainage of wound, also has a right tibial fracture now with intermittent pain.  A long discussion was done with the patient regarding his pain medications.  He is on a fentanyl patch and we have decreased oxycodone to 10-15 mg every 6 hours as needed.  We will further decrease oxycodone to 10-15 mg 3 times daily as needed.  We did discuss risks and benefits of narcotic use.  Ortho follow up done and he could do partial weightbearing up to 50% of body weight.  He will need further rehab as he has 3 flights of steps to navigate when he goes home.  Patient is now on a long leg cast was supposed to return orthopedic clinic tomorrow for cast removal and placement of a hinged knee brace .     2. Acute blood loss anemia   now with pancytopenia, hemoglobin low at 8.3 from 7.6, no evidence of ongoing blood loss.  Pancytopenia is multifactorial with acute blood loss anemia from surgery, CRD stage V, liver cirrhosis, HIV/AIDS.  Aranesp has been started by renal MD and he seen this week for another injection.  He also completed iron infusion, i.e. Venofer ×2 doses    3. CRD (chronic renal disease), stage 5   GFR is lower at 12 from 17 with creatinine at 4.84 from 3.81, and BUN 81 from 60.  Will follow multiple recommendations from renal MD.  his weight has decreased by 3 pounds after aggressive diuresis.  He is now off metolazone and we would expect GFR to increase.  Will await BMP results   4. Liver cirrhosis   LFTs normal except for low albumin, INR 1.2, NH 4 at 161 from 63.  We increased lactulose 45 mL 3 times daily, no hepatic encephalopathy.  Will check abdominal girth 2 times a week, await NH 4 results    5. AIDS   stable on ART, we will need to make sure the patient has a 6 month follow-up with ID    6. Type 2 diabetes mellitus   check hemoglobin A1c, blood sugars range of 106-200.   We increased Lantus to 12 units daily, continue NovoLog sliding scale    7. Chronic pain   as above discussed pain management extensively   8. Essential hypertension   blood pressures are all stable          HISTORY OF PRESENT ILLNESS:   Hosea Garrison is a 56 y.o. male with a history of anal squamous cell cancer status post resection, colostomy, diverting urostomy, kidney cancer status post nephrectomy, liver cirrhosis with recurrent ascites and paracentesis, hepatitis B, HIV/AIDS, type 2 diabetes, hypertension, chronic pain who was just recently discharged from [facility] but fell in his home and injured his right knee.  X-ray showed transverse comminuted impacted fracture of the proximal tibial metaphysis.  Because of his significant amount of pain, he was found to have compartment syndrome and he was taken to the OR emergently by orthopedic surgeon and underwent fasciotomy of the right lower leg on 4/6/17.  He then underwent irrigation and debridement with wound reclosure of the right leg on 4/7/17.  The patient suffered from acute blood loss anemia and received 1 unit packed RBC perioperatively.  On the day prior to his discharge he developed some bleeding at the junction of colostomy site which resolved with pressure and placement of a new pouch.  He was seen by ostomy nurse.  He has not had any recurrence of his bleeding.    Currently, he states that  his pain on his right leg is improving on a fentanyl patch and oxycodone but states that occasionally oxycodone does not work despite big doses.  A long discussion was done regarding decreasing his pain medications and he reluctantly agreed to change frequency of oxycodone use.  He is now on oxycodone 10-15 mg 4 times daily as needed.  States that his lethargy and sleepiness has improved after increasing lactulose especially during the day.  His NH 4 is higher at 166 from 63 and his GFR is at 12.  He also has pancytopenia with hemoglobin at 8.3, WBC 2.2, platelet 92.  He was seen by renal MD and recommendation is to increase torsemide to 20 mg twice daily, plated metolazone 5 mg daily for 5 days, low-sodium diet and daily weights and 1.5 L water restriction.  His weight has increased by about 3 pounds.  He notes that his legs are more swollen and his abdomen has slight increase in abdominal girth.  He does not have any chest pains or shortness of breath.  He was also started on aranesp and had another injection next week.  His most recent hemoglobin on 5/18 is 8.3.  He denies any nausea or vomiting, abdominal pain..  He also denies any fevers or chills.  He states that he still feels weak after his surgery but he is able to participate in physical therapy despite his pain.  His blood sugars are now improving after we have decreased Lantus but he does not have any hypo-glycemic events.  He is on Lantus and NovoLog sliding scale.    Other review of systems are negative.      PAST MEDICAL/SURGICAL HISTORY:  Past Medical History:   Diagnosis Date     AIDS (acquired immune deficiency syndrome)      Cancer     History of Kidney & Rectal Cancer     Chronic renal failure      Cirrhosis      Colostomy in place      Diabetes mellitus     Type II     History of urinary tract infection      Hx of fever      Hydronephrosis      Hypertension      Ileostomy in place      Lower GI bleed      Thrombocytopenia      Past Surgical  History:   Procedure Laterality Date     COLON SURGERY       COLOSTOMY Left      FASCIOTOMY Right 4/6/2017    Procedure: FASCIOTOMY RIGHT LOWER LEG;  Surgeon: Quentin Landis MD;  Location: [facility];  Service:      FRACTURE SURGERY Right     right knee and right ankle surgery     ILEOSTOMY Right      PICC  6/18/2015            SOCIAL HISTORY: He is single and lives alone in his own apartment, he has been in a relationship with his partner for 30 years although they are not living together currently.  He is a smoker, one fourth pack per day for 30 years although he states that his last cigarette was 3 months ago.  He quit alcohol many years ago.      MEDICATIONS:  Reviewed per TCU orders summary    ALLERGIES:  Allergies   Allergen Reactions     Compazine [Prochlorperazine]      Parkinson's type symptoms     Sulfacetamide Other (See Comments)     Patient can't remember         PHYSICAL EXAMINATION:  Vital signs 114/54, 97.3, 83, 20, 99%  General: Alert, oriented x3, not in any form of acute distress, answers questions appropriately, follows simple commands, less conversant, pale, has a sad affect  HEENT: Pale conjunctiva, anicteric sclerae, oral mucosa is moist, oral thrush resolved, no upper or lower teeth   NECK: Supple, without any lymphadenopathy, thyromegaly or any masses  BACK: No kyphosis of the thoracic spine  ABDOMEN: Globular with mildly increased abdominal girth/ascites, soft, nontender to palpation, no organomegaly,  colostomy, site is clean, ileostomy conduit with clear urine per catheter  EXTREMITIES: Good range of motion on both upper and lower extremities except on right lower extremity which is wrapped in a long leg cast, able to wiggle right toes, left leg with 1-2+ pedal edema, no cyanosis or clubbing, no calf tenderness, right arm AV fistula with good bruits  SKIN: Warm and dry, no rashes or erythema noted    LABS:  Lab Results   Component Value Date    WBC 1.9 (LL) 04/12/2017    HGB  8.2 (L) 04/12/2017    HCT 23.8 (L) 04/12/2017    MCV 96 04/12/2017    PLT 93 (L) 04/12/2017     Lab Results   Component Value Date    CREATININE 3.88 (H) 04/12/2017    BUN 64 (H) 04/12/2017     04/12/2017    K 5.0 04/12/2017     (H) 04/12/2017    CO2 16 (L) 04/12/2017       35 minutes TT spent, >50% in coordination of care and counseling regarding the above medical issues and plan of care.  A long discussion was done with patient and nursing staff regarding above problems.  I reviewed medications/labs/available medical records.    Electronically signed by:Zeynep Mijares MD

## 2021-06-10 NOTE — PROGRESS NOTES
OUTPATIENT OSTOMY ASSESSMENT    Patients mode of arrival: wheelchair    INTAKE  Type of Stoma: Permanent Colostomy.   Anticipated date of takedown: NA    Diagnosis Pertinent to Stoma:Colon/Rectal Cancer Date of Diagnosis: 9 years ago  Type of Surgery: Colostomy    Surgery Date: 9 years ago  Surgeon: unknown     Hospital: [facility]    Purpose of this visit:Leaking Problem    Present for Teaching Session: Patient   Present: NA    Pertinent Information: Patient also has urostomy- this was made at the same time as the Colostomy. Visit today to assess colostomy to follow up from inpatient stay.    Current Equipment:    Product # Brand Description   Pouch unknown Amelia Clear with a clip   Flange unknown Amelia CTF flat   Paste      Powder      Deodorizer      Skin prep [id number] Amelia Skin prep           Pouch Change Frequency: 2-3 times weekly  Provider of Care: Emptying: self Pouch Change: self    ASSESSMENT  Stoma Size: Round 1 inches  Protrusion: 1cm  Vascularity: Pink- bleeding granulomas scattered on stoma- mostly around 4-8 o'clock  Mucocutaneous Juncture: Intact  Peristomal Skin: Intact- pink- patient reports that skin is always pink.    Wound: No  Current Wound Care: NA    TREATMENT  Silver Nitrate to : granulomas # of Sticks used: 1    INTERVENTIONS  Refitting done  Miscellaneous Interventions: NA    INSTRUCTIONS GIVEN  WOC Role, Pouching Products: Showed pouching system  and Ordering supplies    PLAN  Change in Supplies: See Outpatient Ostomy Instructions      Total Time Spent with Patient: 40 minutes.  Education time: 15 minutes.  Wound care: 0 minutes.

## 2021-06-10 NOTE — PROGRESS NOTES
OUTPATIENT OSTOMY ASSESSMENT    Patients mode of arrival: wheelchair    INTAKE  Type of Stoma: Permanent Urostomy  Anticipated date of takedown: NA    Diagnosis Pertinent to Stoma:Colon/Rectal Cancer-that spread to the bladder Date of Diagnosis: 9 years ago  Type of Surgery: Ileal Conduit    Surgery Date: 9 years ago  Surgeon: unknown     Hospital: [facility]    Purpose of this visit:Leaking Problem    Present for Teaching Session: Patient   Present: NA    Pertinent Information: Patient reports occasional leaking issues.    Current Equipment:    Product # Brand Description   Pouch [id number] Sar[employer]an Clear pouch   Flange [id number] Saravanan Convex CTF   Paste [id number] Saravanan Barrier ring   Powder      Deodorizer                    Pouch Change Frequency: 1-2 times weekly  Provider of Care: Emptying: self Pouch Change: self    ASSESSMENT  Stoma Size: Oval 7/8x1-1/8 inches  Protrusion: Flush  Vascularity: Pink  Mucocutaneous Juncture: Intact  Peristomal Skin: Abnormal purple discoloration circumferentially up to 0.5cm    Wound: No  Current Wound Care: NA    TREATMENT  NA    INTERVENTIONS  Refitting done  Miscellaneous Interventions: NA    INSTRUCTIONS GIVEN  WOC Role, Anatomy, Fluids: Drink 6-8 glasses of fluids daily , Pouching Products: Showed pouching system , Insurance and Ordering supplies    PLAN  Continue same Pouching System      Total Time Spent with Patient: 25 minutes.  Education time: 10 minutes.  Wound care: 0 minutes.

## 2021-06-10 NOTE — PROGRESS NOTES
[facility]        Visit Type: Review Of Multiple Medical Conditions (Type 2 diabetes, liver cirrhosis, CRD)    Code Status:  FULL CODE  Facility:  [facility] [[id number]]          PCP: No Primary Care Provider       PHONE: None     FAX:[phone]        ASSESSMENT/PLAN:  1. Liver cirrhosis     2. CRD (chronic renal disease), stage 5     3. Type 2 diabetes mellitus     4. Compartment syndrome, traumatic, lower extremity     5. AIDS     6. Chronic pain     7. Essential hypertension       1. Compartment syndrome, traumatic, lower extremity   status post fasciotomy and incision and drainage of wound, also has a right tibial fracture now with intermittent pain.  A long discussion was done with the patient regarding his pain medications.  He is on a fentanyl patch and also on 10-15 mg every 4 hours oxycodone.  We did discuss risks and benefits of narcotic use.  Ortho follow up today to see if he can get an okay to do more weightbearing as he has 3 flights of steps to navigate when he goes home.  Patient is now on a long leg cast was supposed to return  in 5 weeks time for cast removal and placement of a hinged knee brace but was seen again by orthopedics since he wants to go home soon but he has a lot of stairs in his home.  Per orthopedics, he can do some toe touching, partial weightbearing up to 50% of body weight.     2. Acute blood loss anemia   now with pancytopenia, hemoglobin low at 7.6, no evidence of ongoing blood loss.  Pancytopenia is multifactorial with acute blood loss anemia from surgery, CRD stage V, liver cirrhosis, HIV/AIDS.  Aranesp has been started by renal MD and he seen this week for another injection.  He also completed iron infusion, i.e. Venofer ×2 doses    3. CRD (chronic renal disease), stage 5   GFR is lower at 12 from 17 with creatinine at 4.84 from 3.81, and BUN 81 from 60.  Will follow multiple recommendations from  renal MD. his weight has decreased by 3 pounds after aggressive diuresis.  He is now off metolazone and we would expect GFR to increase    4. Liver cirrhosis   LFTs normal except for low albumin, INR 1.2, NH 4 at 161 from 63.  We increased lactulose 45 mL 3 times daily    5. AIDS   stable on ART, we will need to make sure the patient has a 6 month follow-up with ID    6. Type 2 diabetes mellitus   check hemoglobin A1c, blood sugars range of 127-119, blood sugars slightly higher with decreasing Lantus to 10 units daily and changing NovoLog sliding scale as follows, less than 180 no insulin sliding scale, 1  units, 221-263 units, 2  units, 301-345 units, 3  units, greater than 408 units.  Hold NovoLog sliding scale for blood sugars less than 100.  Will increase Lantus to 12 units daily    7. Chronic pain   as above discussed pain management extensively   8. Essential hypertension   blood pressures are all stable          HISTORY OF PRESENT ILLNESS:   Hosea Garrison is a 56 y.o. male with a history of anal squamous cell cancer status post resection, colostomy, diverting urostomy, kidney cancer status post nephrectomy, liver cirrhosis with recurrent ascites and paracentesis, hepatitis B, HIV/AIDS, type 2 diabetes, hypertension, chronic pain who was just recently discharged from [facility] but fell in his home and injured his right knee.  X-ray showed transverse comminuted impacted fracture of the proximal tibial metaphysis.  Because of his significant amount of pain, he was found to have compartment syndrome and he was taken to the OR emergently by orthopedic surgeon and underwent fasciotomy of the right lower leg on 4/6/17.  He then underwent irrigation and debridement with wound reclosure of the right leg on 4/7/17.  The patient suffered from acute blood loss anemia and received 1 unit packed RBC perioperatively.  On the day prior to his discharge he developed some bleeding at the junction of  colostomy site which resolved with pressure and placement of a new pouch.  He was seen by ostomy nurse.  He has not had any recurrence of his bleeding.    Currently, he states that his pain on his right leg is improving on a fentanyl patch and oxycodone but states that occasionally oxycodone does not work despite big doses.  A long discussion was done regarding decreasing his pain medications but he states that he is not ready at this point to decrease his narcotics.  States that his lethargy and sleepiness has improved after increasing lactulose especially during the day.  His NH 4 is higher at 16 from 63 and his GFR is at 12.  He also has pancytopenia with hemoglobin at 7.6, WBC 1.8, platelet 104.  He was seen by renal MD and recommendation is to increase torsemide to 20 mg twice daily, plated metolazone 5 mg daily for 5 days, low-sodium diet and daily weights and 1.5 L water restriction.  His weight has decreased by about 3 pounds.  He was also started on aranesp and had another injection this week.  He denies any nausea or vomiting, abdominal pain..  He also denies any fevers or chills, chest pains or shortness of breath.  He states that he still feels weak after his surgery but he is able to participate in physical therapy despite his pain.  His blood sugars have been high after we have decreased Lantus but he does not have any hypo-glycemic events.  He is on Lantus and NovoLog sliding scale.    Other review of systems are negative.      PAST MEDICAL/SURGICAL HISTORY:  Past Medical History:   Diagnosis Date     AIDS (acquired immune deficiency syndrome)      Cancer     History of Kidney & Rectal Cancer     Chronic renal failure      Cirrhosis      Colostomy in place      Diabetes mellitus     Type II     History of urinary tract infection      Hx of fever      Hydronephrosis      Hypertension      Ileostomy in place      Lower GI bleed      Thrombocytopenia      Past Surgical History:   Procedure Laterality  Date     COLON SURGERY       COLOSTOMY Left      FASCIOTOMY Right 4/6/2017    Procedure: FASCIOTOMY RIGHT LOWER LEG;  Surgeon: Quentin Landis MD;  Location: [facility];  Service:      FRACTURE SURGERY Right     right knee and right ankle surgery     ILEOSTOMY Right      PICC  6/18/2015            SOCIAL HISTORY: He is single and lives alone in his own apartment, he has been in a relationship with his partner for 30 years although they are not living together currently.  He is a smoker, one fourth pack per day for 30 years although he states that his last cigarette was 3 months ago.  He quit alcohol many years ago.      MEDICATIONS:  Reviewed per TCU orders summary    ALLERGIES:  Allergies   Allergen Reactions     Compazine [Prochlorperazine]      Parkinson's type symptoms     Sulfacetamide Other (See Comments)     Patient can't remember         PHYSICAL EXAMINATION:  Vital signs 126/75, 98.2, 82, 97, 99%  General: Alert, oriented x3, not in any form of acute distress, answers questions appropriately, follows simple commands, less conversant, pale, has a sad affect  HEENT: Pale conjunctiva, anicteric sclerae, oral mucosa is moist, oral thrush resolved, no upper or lower teeth   NECK: Supple, without any lymphadenopathy, thyromegaly or any masses  BACK: No kyphosis of the thoracic spine  ABDOMEN: Globular with ascites, soft, nontender to palpation, no organomegaly,  colostomy, site is clean, ileostomy conduit with clear urine per catheter  EXTREMITIES: Good range of motion on both upper and lower extremities except on right lower extremity which is wrapped in a long leg cast, able to wiggle right toes, left leg with trace pedal edema, no cyanosis or clubbing, no calf tenderness, right arm AV fistula with good bruits  SKIN: Warm and dry, no rashes or erythema noted    LABS:  Lab Results   Component Value Date    WBC 1.9 (LL) 04/12/2017    HGB 8.2 (L) 04/12/2017    HCT 23.8 (L) 04/12/2017    MCV 96 04/12/2017     PLT 93 (L) 04/12/2017     Lab Results   Component Value Date    CREATININE 3.88 (H) 04/12/2017    BUN 64 (H) 04/12/2017     04/12/2017    K 5.0 04/12/2017     (H) 04/12/2017    CO2 16 (L) 04/12/2017       Electronically signed by:Zeynep Mijares MD

## 2021-06-11 NOTE — PROGRESS NOTES
[facility]    DATE: 2017    NAME:  Hosea Garrison             :  1960  MRN: [id number]  CODE STATUS:  FULL CODE    FACILITY:  [facility] [[id number]]       ROOM:   [id number]    CHIEF COMPLAINT/REASON FOR VISIT:  Chief Complaint   Patient presents with     Problem Visit     Pain management     HISTORY OF PRESENT ILLNESS: Hosea Garrison is a 56 y.o. male with Anal squamous cell cancer status post resection, Colostomy, Diverting urostomy, Kidney cancer status post nephrectomy, Liver cirrhosis with recurrent ascites and paracentesis, Hepatitis B, HIV/AIDS, Type 2 diabetes, Hypertension, and Chronic pain who was just recently discharged from [facility] but fell in his home and injured his right knee.  X-ray showed transverse comminuted impacted fracture of the proximal tibial metaphysis.  Because of his significant amount of pain, he was found to have compartment syndrome and was taken to the OR emergently by the orthopedic surgeon and underwent fasciotomy of the right lower leg on 17.  He then underwent irrigation and debridement with wound reclosure of the right leg on 17.  The patient suffered from acute blood loss anemia and received 1 unit packed RBC, perioperatively.  On the day prior to his discharge he developed some bleeding at the junction of his colostomy site which resolved with pressure and placement of a new pouch.  He was seen by the ostomy nurse.  He has not had any recurrence of his bleeding.    Today, patient is seen at the bedside in his wheelchair. He reports that he is not interested in decreasing his pain medications any further.  He is currently on Fentanyl patch and Oxycodone. He is participating in therapy.  He continues to be nonweightbearing.     Past Medical History:   Diagnosis Date     AIDS (acquired immune deficiency syndrome)      Cancer     History of Kidney & Rectal Cancer     Chronic renal failure      Cirrhosis       Colostomy in place      Diabetes mellitus     Type II     History of urinary tract infection      Hx of fever      Hydronephrosis      Hypertension      Ileostomy in place      Lower GI bleed      Thrombocytopenia      Past Surgical History:   Procedure Laterality Date     COLON SURGERY       COLOSTOMY Left      FASCIOTOMY Right 4/6/2017    Procedure: FASCIOTOMY RIGHT LOWER LEG;  Surgeon: Quentin Landis MD;  Location: [facility];  Service:      FRACTURE SURGERY Right     right knee and right ankle surgery     ILEOSTOMY Right      PICC  6/18/2015          No family history on file.     Social History     Social History     Marital status: Single     Spouse name: N/A     Number of children: N/A     Years of education: N/A     Occupational History     Not on file.     Social History Main Topics     Smoking status: Current Every Day Smoker     Packs/day: 0.25     Years: 30.00     Smokeless tobacco: Not on file     Alcohol use No      Comment: patient reports very rarely     Drug use: No      Comment: denies     Sexual activity: No     Other Topics Concern     Not on file     Social History Narrative     Allergies   Allergen Reactions     Compazine [Prochlorperazine]      Parkinson's type symptoms     Sulfacetamide Other (See Comments)     Patient can't remember     Current Outpatient Prescriptions   Medication Sig Dispense Refill     abacavir (ZIAGEN) 300 mg tablet Take 300 mg by mouth 2 (two) times a day.       acetaminophen (TYLENOL) 500 MG tablet Take 2 tablets (1,000 mg total) by mouth 3 (three) times a day as needed for pain. 40 tablet 0     albuterol (PROVENTIL HFA;VENTOLIN HFA) 90 mcg/actuation inhaler Inhale 2 puffs every 4 (four) hours as needed for wheezing.       aspirin 325 MG tablet Take 1 tablet (325 mg total) by mouth 2 (two) times a day. 60 tablet 0     citalopram (CELEXA) 40 MG tablet Take 40 mg by mouth daily.       citric acid-sodium citrate (BICITRA) 500-334 mg/5 mL solution Take 30 mL  by mouth 2 (two) times a day. For Chronic metabolic acidosis       ergocalciferol (ERGOCALCIFEROL) 50,000 unit capsule Take 50,000 Units by mouth once a week. Every Saturday       fenofibrate (TRICOR) 48 MG tablet Take 48 mg by mouth daily.       fentaNYL (DURAGESIC) 50 mcg/hr Place 1 patch on the skin every third day. 5 patch 0     ferrous sulfate 325 (65 FE) MG tablet Take 1 tablet by mouth 2 (two) times a day.        gabapentin (NEURONTIN) 300 MG capsule Take 300 mg by mouth bedtime.       insulin detemir (LEVEMIR) 100 unit/mL injection Inject 12 Units under the skin at bedtime.       lactulose 10 gram/15 mL (15 mL) Soln Take 30 g by mouth 3 (three) times a day.        lamiVUDine (EPIVIR) 100 MG tablet Take 1 tablet (100 mg total) by mouth daily.       loratadine (CLARITIN) 10 mg tablet Take 10 mg by mouth every other day.        LORazepam (ATIVAN) 0.5 MG tablet Take 1 tablet (0.5 mg total) by mouth 2 (two) times a day as needed for anxiety. 30 tablet 0     naphazoline-pheniramine (NAPHCON-A) 0.025-0.3 % ophthalmic solution Administer 1 drop to both eyes every 6 (six) hours as needed.       nevirapine (VIRAMUNE) 200 mg tablet Take 200 mg by mouth 2 (two) times a day.       NOVOLOG 100 unit/mL injection Check blood sugar three (3) times daily.  11.9 Type 2 without complications 10 mL PRN     omeprazole (PRILOSEC) 20 MG capsule Take 20 mg by mouth Daily before breakfast.       ondansetron (ZOFRAN) 4 MG tablet Take 4 mg by mouth every 8 (eight) hours as needed for nausea.        oxyCODONE 10 mg Tab Take 10-15 mg by mouth every 4 (four) hours as needed for pain. 70 tablet 0     QUEtiapine (SEROQUEL) 50 MG tablet Take 50 mg by mouth bedtime.       rifAXIMin (XIFAXAN) 550 mg Tab tablet Take 550 mg by mouth 2 (two) times a day.       senna-docusate (PERICOLACE) 8.6-50 mg tablet Take 1 tablet by mouth daily as needed for constipation.       simethicone (MYLICON) 80 MG chewable tablet Chew 80 mg every 6 (six) hours as  needed for flatulence.       sodium bicarbonate 650 MG tablet Take 1 tablet (650 mg total) by mouth 3 (three) times a day.  0     torsemide (DEMADEX) 20 MG tablet Take 20 mg by mouth 2 (two) times a day.       No current facility-administered medications for this visit.      REVIEW OF SYSTEMS    Currently, no fever, chills, or rigors.  He does not have any visual or hearing problems. He denies any chest pain, headaches, palpitations, lightheadedness, dizziness, shortness of breath, or cough. His appetite is good, he denies any GERD symptoms, he denies any difficulty with swallowing, nausea, or vomiting.  He denies any abdominal pain, diarrhea or constipation. He denies any urinary symptoms. No insomnia. No active bleeding. No rash.     PHYSICAL EXAMINATION  Vitals:    07/04/17 2146   BP: 110/58   Pulse: 80   Resp: 18   Temp: 97.1  F (36.2  C)   SpO2: 99%   Weight: 168 lb 6.4 oz (76.4 kg)     General: Alert, oriented x3, not in any form of acute distress, answers questions appropriately, follows simple commands, less conversant, pale, has a sad affect  HEENT: Pale conjunctiva, anicteric sclerae, oral mucosa is moist, oral thrush resolved, no upper or lower teeth   NECK: Supple, without any lymphadenopathy, thyromegaly or any masses  BACK: No kyphosis of the thoracic spine  ABDOMEN: Globular with mildly increased abdominal girth/ascites, soft, nontender to palpation, no organomegaly,  colostomy, site is clean, ileostomy conduit with clear urine per catheter  EXTREMITIES: Good range of motion on both upper and lower extremities except on right lower extremity which is wrapped in a long leg cast, able to wiggle right toes, left leg with 1-2+ pedal edema, no cyanosis or clubbing, no calf tenderness, right arm AV fistula with good bruits  SKIN: Warm and dry, no rashes or erythema noted    LABS:    Lab Results   Component Value Date    WBC 1.9 (LL) 04/12/2017    HGB 8.2 (L) 04/12/2017    HCT 23.8 (L) 04/12/2017    MCV 96  04/12/2017    PLT 93 (L) 04/12/2017     Results for orders placed or performed during the hospital encounter of 04/05/17   Basic Metabolic Panel   Result Value Ref Range    Sodium 135 (L) 136 - 145 mmol/L    Potassium 4.0 3.5 - 5.0 mmol/L    Chloride 107 98 - 107 mmol/L    CO2 17 (L) 22 - 31 mmol/L    Anion Gap, Calculation 11 5 - 18 mmol/L    Glucose 119 70 - 125 mg/dL    Calcium 7.6 (L) 8.5 - 10.5 mg/dL    BUN 73 (H) 8 - 22 mg/dL    Creatinine 4.32 (H) 0.70 - 1.30 mg/dL    GFR MDRD Af Amer 17 (L) >60 mL/min/1.73m2    GFR MDRD Non Af Amer 14 (L) >60 mL/min/1.73m2     Lab Results   Component Value Date    HGBA1C 5.7 08/13/2016     Vitamin D, Total (25-Hydroxy)   Date Value Ref Range Status   12/30/2016 37.0 30.0 - 80.0 ng/mL Final       ASSESSMENT/PLAN:    1. Chronic pain syndrome -  Will continue current pain medication regimen   2. Colostomy in place    3. Ileostomy in place    4. AIDS  - Stable on ART, we will need to make sure the patient has a 6 month follow-up with ID   5. Liver cirrhosis - LFTs normal except for low albumin, INR 1.2, NH 4 at 180 from 161 from 63. A recent increase of Lactulose to 45 mL 3 times daily, no hepatic encephalopathy.  Will monitor  NH4  And LFT's clsoely   6. Traumatic compartment syndrome of right lower extremity, sequela - status post fasciotomy and incision and drainage of wound, also has a right tibial fracture now with intermittent pain.  He is on a Fentanyl patch and Oxycodone to 10-15 mg TID hours as needed.  Ortho follow up done and he could do partial weightbearing up to 50% of body weight.  He will need further rehab as he has 3 flights of steps to navigate throughout his home.  Patient now has a hinged knee brace.                   Electronically signed by:  Maty Mcknight CNP

## 2021-06-11 NOTE — PROGRESS NOTES
[facility]        Visit Type: Review Of Multiple Medical Conditions (Multiple medical issues)    Code Status:  FULL CODE  Facility:  [facility] [[id number]]          PCP: No Primary Care Provider       PHONE: None     FAX:[phone]        ASSESSMENT/PLAN:  1. CRD (chronic renal disease), stage 5     2. Liver cirrhosis     3. Type 2 diabetes mellitus     4. Chronic pain syndrome     5. Traumatic compartment syndrome of right lower extremity, initial encounter     6. Essential hypertension     7. Ileostomy in place     8. AIDS     9. Anemia       1. Compartment syndrome, traumatic, lower extremity   status post fasciotomy and incision and drainage of wound, also has a right tibial fracture now with intermittent pain.  A long discussion was done with the patient regarding his pain medications.  He is on a fentanyl patch and we have decreased oxycodone to 10-15 mg every 8 hours as needed.  We did discuss risks and benefits of narcotic use.  We have agreed the tibial start tapering the oxycodone further by next week.  Ortho follow up done and he could do 100% weightbearing as tolerated.  He will need further rehab as he has 3 flights of steps to navigate when he goes home.  OT involved to assess ADLs for return home with stairs.     2. Acute blood loss anemia   now with pancytopenia, hemoglobin low at 8.3 from 7.6, no evidence of ongoing blood loss.  Pancytopenia is multifactorial with acute blood loss anemia from surgery, CRD stage V, liver cirrhosis, HIV/AIDS.  Aranesp has been started by renal MD and he has received RNS in our facility every 2 weeks.  He also completed iron infusion, i.e. Venofer ×2 doses    3. CRD (chronic renal disease), stage 5   GFR is lower at 13 from 17 with creatinine at 4.81 from 3.81, and BUN 91 from 60.  Review of his previous BMP is showed that this high levels are his baseline.  Will follow multiple recommendations  from renal MD.  He is now on metolazone at 2.5 mg weekly and we would expect GFR to increase.  Will await BMP results.  He has renal follow-up this week    4. Liver cirrhosis   LFTs normal except for low albumin, INR 1.2, NH 4 at 180 from 161 from 63.  We increased lactulose 45 mL 3 times daily, no hepatic encephalopathy.  Recheck NH4 next week   5. AIDS   stable on ART, we will need to make sure the patient has a 6 month follow-up with ID    6. Type 2 diabetes mellitus   blood sugars better range of .   Now on insulin detemir to 15 units daily, continue NovoLog sliding scale    7. Chronic pain   as above discussed pain management extensively   8. Essential hypertension   blood pressures are all stable          HISTORY OF PRESENT ILLNESS:   Hosea Garrison is a 56 y.o. male with a history of anal squamous cell cancer status post resection, colostomy, diverting urostomy, kidney cancer status post nephrectomy, liver cirrhosis with recurrent ascites and paracentesis, hepatitis B, HIV/AIDS, type 2 diabetes, hypertension, chronic pain who was just recently discharged from [facility] but fell in his home and injured his right knee.  X-ray showed transverse comminuted impacted fracture of the proximal tibial metaphysis.  Because of his significant amount of pain, he was found to have compartment syndrome and he was taken to the OR emergently by orthopedic surgeon and underwent fasciotomy of the right lower leg on 4/6/17.  He then underwent irrigation and debridement with wound reclosure of the right leg on 4/7/17.  The patient suffered from acute blood loss anemia and received 1 unit packed RBC perioperatively.  On the day prior to his discharge he developed some bleeding at the junction of colostomy site which resolved with pressure and placement of a new pouch.  He was seen by ostomy nurse.  He has not had any recurrence of his bleeding.    Currently, he states that his pain on his right leg has improved on  a fentanyl patch and oxycodone but states that occasionally oxycodone does not work despite big doses.  He is now on decreased frequency of oxycodone and he states that he is able to tolerate twice daily-3 times daily dosing for pain management.  He is now on oxycodone 10-15 mg 3 times daily as needed.  States that his lethargy and sleepiness has improved after increasing lactulose especially during the day.  His NH 4 is higher at 180 [id number] from 63 and his GFR is at 12.  He also has pancytopenia with hemoglobin at 8.3, WBC 2.2, platelet 92.  He is now receiving Aranesp every 2 weeks.  He notes that his legs are less swollen and his abdomen has slight decrease in abdominal girth.  He states that getting metolazone 2.5 mg once a week is helping control his fluid retention.  He does not have any chest pains or shortness of breath.   He denies any nausea or vomiting, abdominal pain..  He also denies any fevers or chills.  He has been seen by orthopedics, and he is now 100% weightbearing as tolerated and walks with a walker.  He is also starting stairs training.  His blood sugars are now improving after we have increased Lantus.  He is on Lantus and NovoLog sliding scale.    Other review of systems are negative.      PAST MEDICAL/SURGICAL HISTORY:  Past Medical History:   Diagnosis Date     AIDS (acquired immune deficiency syndrome)      Cancer     History of Kidney & Rectal Cancer     Chronic renal failure      Cirrhosis      Colostomy in place      Diabetes mellitus     Type II     History of urinary tract infection      Hx of fever      Hydronephrosis      Hypertension      Ileostomy in place      Lower GI bleed      Thrombocytopenia      Past Surgical History:   Procedure Laterality Date     COLON SURGERY       COLOSTOMY Left      FASCIOTOMY Right 4/6/2017    Procedure: FASCIOTOMY RIGHT LOWER LEG;  Surgeon: Quentin Landis MD;  Location: [facility] OR;  Service:      FRACTURE SURGERY Right     right knee and  right ankle surgery     ILEOSTOMY Right      PICC  6/18/2015            SOCIAL HISTORY: He is single and lives alone in his own apartment, he has been in a relationship with his partner for 30 years although they are not living together currently.  He is a smoker, one fourth pack per day for 30 years although he states that his last cigarette was 3 months ago.  He quit alcohol many years ago.      MEDICATIONS:  Reviewed per TCU orders summary    ALLERGIES:  Allergies   Allergen Reactions     Compazine [Prochlorperazine]      Parkinson's type symptoms     Sulfacetamide Other (See Comments)     Patient can't remember         PHYSICAL EXAMINATION:  Vital signs 114/67, 97.7, 67, 16, 99%, 168.2 pounds  General: Alert, oriented x3, not in any form of acute distress, answers questions appropriately, follows simple commands, less conversant, pale, has a sad affect  HEENT: Pale conjunctiva, anicteric sclerae, oral mucosa is moist, oral thrush resolved, no upper or lower teeth   NECK: Supple, without any lymphadenopathy, thyromegaly or any masses  BACK: No kyphosis of the thoracic spine  ABDOMEN: Globular with mildly increased abdominal girth/ascites, soft, nontender to palpation, no organomegaly,  colostomy, site is clean, ileostomy conduit with clear urine per catheter  EXTREMITIES: Good range of motion on both upper and lower extremities except on right lower extremity which is wrapped in a hinged brace, able to wiggle right toes, left leg with 1+ pedal edema, no cyanosis or clubbing, no calf tenderness, right arm AV fistula with good bruits  SKIN: Warm and dry, no rashes or erythema noted    LABS: Reviewed      35 minutes TT spent, >50% in coordination of care and counseling regarding the above medical issues and plan of care.  A long discussion was done with patient and nursing staff regarding above problems.  I reviewed medications/labs/available medical records.    Electronically signed by:Zeynep Mijares MD

## 2021-06-11 NOTE — PROGRESS NOTES
"     [facility]    DATE: 2017    NAME:  Hosea Garrison             :  1960  MRN: [id number]  CODE STATUS:  FULL CODE    FACILITY:  [facility] [[id number]]       ROOM:   [id number]    CHIEF COMPLAINT/REASON FOR VISIT:  Chief Complaint   Patient presents with     Problem Visit     Discharge planning     HISTORY OF PRESENT ILLNESS: Hosea Garrison is a 56 y.o. male with Anal squamous cell cancer status post resection, Colostomy, Diverting urostomy, Kidney cancer status post nephrectomy, Liver cirrhosis with recurrent ascites and paracentesis, Hepatitis B, HIV/AIDS, Type 2 diabetes, Hypertension, and Chronic pain who was just recently discharged from [facility] but fell in his home and injured his right knee.  X-ray showed transverse comminuted impacted fracture of the proximal tibial metaphysis.  Because of his significant amount of pain, he was found to have compartment syndrome and was taken to the OR emergently by the orthopedic surgeon and underwent fasciotomy of the right lower leg on 17.  He then underwent irrigation and debridement with wound reclosure of the right leg on 17.  The patient suffered from acute blood loss anemia and received 1 unit packed RBC, perioperatively.  On the day prior to his discharge he developed some bleeding at the junction of his colostomy site which resolved with pressure and placement of a new pouch.  He was seen by the ostomy nurse.  He has not had any recurrence of his bleeding.    Today, patient is seen at the bedside in his wheelchair. He states that he is going to transfer to an alternate facility because \"[facility]" doesn't want him to be here any longer.  When asked,  reports that Hosea leaves that facility to go smoke.  The transition will not take place prior to the holiday weekend. Pain is stable.  Appetite is fair.  No constipation.    Past Medical History:   Diagnosis Date     " AIDS (acquired immune deficiency syndrome)      Cancer     History of Kidney & Rectal Cancer     Chronic renal failure      Cirrhosis      Colostomy in place      Diabetes mellitus     Type II     History of urinary tract infection      Hx of fever      Hydronephrosis      Hypertension      Ileostomy in place      Lower GI bleed      Thrombocytopenia      Past Surgical History:   Procedure Laterality Date     COLON SURGERY       COLOSTOMY Left      FASCIOTOMY Right 4/6/2017    Procedure: FASCIOTOMY RIGHT LOWER LEG;  Surgeon: Quentin Landis MD;  Location: [facility];  Service:      FRACTURE SURGERY Right     right knee and right ankle surgery     ILEOSTOMY Right      PICC  6/18/2015          No family history on file.     Social History     Social History     Marital status: Single     Spouse name: N/A     Number of children: N/A     Years of education: N/A     Occupational History     Not on file.     Social History Main Topics     Smoking status: Current Every Day Smoker     Packs/day: 0.25     Years: 30.00     Smokeless tobacco: Not on file     Alcohol use No      Comment: patient reports very rarely     Drug use: No      Comment: denies     Sexual activity: No     Other Topics Concern     Not on file     Social History Narrative     Allergies   Allergen Reactions     Compazine [Prochlorperazine]      Parkinson's type symptoms     Sulfacetamide Other (See Comments)     Patient can't remember     Current Outpatient Prescriptions   Medication Sig Dispense Refill     abacavir (ZIAGEN) 300 mg tablet Take 300 mg by mouth 2 (two) times a day.       acetaminophen (TYLENOL) 500 MG tablet Take 2 tablets (1,000 mg total) by mouth 3 (three) times a day as needed for pain. 40 tablet 0     albuterol (PROVENTIL HFA;VENTOLIN HFA) 90 mcg/actuation inhaler Inhale 2 puffs every 4 (four) hours as needed for wheezing.       aspirin 325 MG tablet Take 1 tablet (325 mg total) by mouth 2 (two) times a day. 60 tablet 0      citalopram (CELEXA) 40 MG tablet Take 40 mg by mouth daily.       citric acid-sodium citrate (BICITRA) 500-334 mg/5 mL solution Take 30 mL by mouth 2 (two) times a day. For Chronic metabolic acidosis       ergocalciferol (ERGOCALCIFEROL) 50,000 unit capsule Take 50,000 Units by mouth once a week. Every Saturday       fenofibrate (TRICOR) 48 MG tablet Take 48 mg by mouth daily.       fentaNYL (DURAGESIC) 50 mcg/hr Place 1 patch on the skin every third day. 5 patch 0     ferrous sulfate 325 (65 FE) MG tablet Take 1 tablet by mouth 2 (two) times a day.        gabapentin (NEURONTIN) 300 MG capsule Take 300 mg by mouth bedtime.       insulin detemir (LEVEMIR) 100 unit/mL injection Inject 12 Units under the skin at bedtime.       lactulose 10 gram/15 mL (15 mL) Soln Take 30 g by mouth 3 (three) times a day.        lamiVUDine (EPIVIR) 100 MG tablet Take 1 tablet (100 mg total) by mouth daily.       loratadine (CLARITIN) 10 mg tablet Take 10 mg by mouth every other day.        LORazepam (ATIVAN) 0.5 MG tablet Take 1 tablet (0.5 mg total) by mouth 2 (two) times a day as needed for anxiety. 30 tablet 0     naphazoline-pheniramine (NAPHCON-A) 0.025-0.3 % ophthalmic solution Administer 1 drop to both eyes every 6 (six) hours as needed.       nevirapine (VIRAMUNE) 200 mg tablet Take 200 mg by mouth 2 (two) times a day.       NOVOLOG 100 unit/mL injection Check blood sugar three (3) times daily.  11.9 Type 2 without complications 10 mL PRN     omeprazole (PRILOSEC) 20 MG capsule Take 20 mg by mouth Daily before breakfast.       ondansetron (ZOFRAN) 4 MG tablet Take 4 mg by mouth every 8 (eight) hours as needed for nausea.        oxyCODONE 10 mg Tab Take 10-15 mg by mouth every 4 (four) hours as needed for pain. 70 tablet 0     QUEtiapine (SEROQUEL) 50 MG tablet Take 50 mg by mouth bedtime.       rifAXIMin (XIFAXAN) 550 mg Tab tablet Take 550 mg by mouth 2 (two) times a day.       senna-docusate (PERICOLACE) 8.6-50 mg tablet Take  1 tablet by mouth daily as needed for constipation.       simethicone (MYLICON) 80 MG chewable tablet Chew 80 mg every 6 (six) hours as needed for flatulence.       sodium bicarbonate 650 MG tablet Take 1 tablet (650 mg total) by mouth 3 (three) times a day.  0     torsemide (DEMADEX) 20 MG tablet Take 20 mg by mouth 2 (two) times a day.       No current facility-administered medications for this visit.      REVIEW OF SYSTEMS    Currently, no fever, chills, or rigors.  He does not have any visual or hearing problems. He denies any chest pain, headaches, palpitations, lightheadedness, dizziness, shortness of breath, or cough. His appetite is good, he denies any GERD symptoms, he denies any difficulty with swallowing, nausea, or vomiting.  He denies any abdominal pain, diarrhea or constipation. He denies any urinary symptoms. No insomnia. No active bleeding. No rash.     PHYSICAL EXAMINATION  Vitals:    07/05/17 1828   BP: 154/58   Pulse: 81   Resp: 18   Temp: (!) 96.5  F (35.8  C)   SpO2: 97%   Weight: 172 lb 11.2 oz (78.3 kg)     General: Alert, oriented x3, not in any form of acute distress, answers questions appropriately, follows simple commands, less conversant, pale, has a sad affect  HEENT: Pale conjunctiva, anicteric sclerae, oral mucosa is moist, oral thrush resolved, no upper or lower teeth   NECK: Supple, without any lymphadenopathy, thyromegaly or any masses  BACK: No kyphosis of the thoracic spine  ABDOMEN: Globular with mildly increased abdominal girth/ascites, soft, nontender to palpation, no organomegaly,  colostomy, site is clean, ileostomy conduit with clear urine per catheter  EXTREMITIES: Good range of motion on both upper and lower extremities except on right lower extremity which is wrapped in a long leg cast, able to wiggle right toes, left leg with 1-2+ pedal edema, no cyanosis or clubbing, no calf tenderness, right arm AV fistula with good bruits  SKIN: Warm and dry, no rashes or erythema  noted    LABS:    Lab Results   Component Value Date    WBC 1.9 (LL) 04/12/2017    HGB 8.2 (L) 04/12/2017    HCT 23.8 (L) 04/12/2017    MCV 96 04/12/2017    PLT 93 (L) 04/12/2017     Results for orders placed or performed during the hospital encounter of 04/05/17   Basic Metabolic Panel   Result Value Ref Range    Sodium 135 (L) 136 - 145 mmol/L    Potassium 4.0 3.5 - 5.0 mmol/L    Chloride 107 98 - 107 mmol/L    CO2 17 (L) 22 - 31 mmol/L    Anion Gap, Calculation 11 5 - 18 mmol/L    Glucose 119 70 - 125 mg/dL    Calcium 7.6 (L) 8.5 - 10.5 mg/dL    BUN 73 (H) 8 - 22 mg/dL    Creatinine 4.32 (H) 0.70 - 1.30 mg/dL    GFR MDRD Af Amer 17 (L) >60 mL/min/1.73m2    GFR MDRD Non Af Amer 14 (L) >60 mL/min/1.73m2     Lab Results   Component Value Date    HGBA1C 5.7 08/13/2016     Vitamin D, Total (25-Hydroxy)   Date Value Ref Range Status   12/30/2016 37.0 30.0 - 80.0 ng/mL Final       ASSESSMENT/PLAN:    1. Chronic pain syndrome - Will continue current pain medication regimen   2. AIDS - Stable on ART, we will need to make sure the patient has a 6 month follow-up with ID   3. Liver cirrhosis  - LFTs normal except for low albumin, INR 1.2, NH 4 at 180 from 161 from 63. A recent increase of Lactulose to 45 mL 3 times daily, no hepatic encephalopathy.  Will monitor  NH4  And LFT's clsoely   4. Discharge planning issues - Discharge pending to an alternated facility   5. Traumatic compartment syndrome of right lower extremity, sequela  - status post fasciotomy and incision and drainage of wound, also has a right tibial fracture now with intermittent pain.  He is on a Fentanyl patch and Oxycodone to 10-15 mg TID hours as needed.  Ortho follow up done and he could do partial weightbearing up to 50% of body weight.  He will need further rehab as he has 3 flights of steps to navigate throughout his home.  Patient now has a hinged knee brace.                         Electronically signed by:  Maty Mcknight CNP

## 2021-06-11 NOTE — PROGRESS NOTES
[facility]        Visit Type: Review Of Multiple Medical Conditions (Multiple medical issues)    Code Status:  FULL CODE  Facility:  [facility] [[id number]]          PCP: No Primary Care Provider       PHONE: None     FAX:[phone]        ASSESSMENT/PLAN:  1. Traumatic compartment syndrome of right lower extremity, initial encounter     2. CRD (chronic renal disease), stage 5     3. Liver cirrhosis     4. Type 2 diabetes mellitus     5. Anemia     6. Chronic pain syndrome     7. Essential hypertension     8. Ileostomy in place       1. Compartment syndrome, traumatic, lower extremity   status post fasciotomy and incision and drainage of wound, also has a right tibial fracture now with intermittent pain.  A long discussion was done with the patient regarding his pain medications.  He is on a fentanyl patch and we have decreased oxycodone to 10-15 mg every 8 hours as needed.  We did discuss risks and benefits of narcotic use.  Ortho follow up done and he could do 100% weightbearing as tolerated.  He will need further rehab as he has 3 flights of steps to navigate when he goes home.  We will get OT involved to assess ADLs for return home with stairs.     2. Acute blood loss anemia   now with pancytopenia, hemoglobin low at 8.3 from 7.6, no evidence of ongoing blood loss.  Pancytopenia is multifactorial with acute blood loss anemia from surgery, CRD stage V, liver cirrhosis, HIV/AIDS.  Aranesp has been started by renal MD and he seen this week for another injection.  He also completed iron infusion, i.e. Venofer ×2 doses    3. CRD (chronic renal disease), stage 5   GFR is lower at 12 from 17 with creatinine at 4.94 from 3.81, and BUN 94 from 60.  Will follow multiple recommendations from renal MD. his weight has decreased by 3 pounds after aggressive diuresis.  He is now on metolazone at 2.5 mg weekly and we would expect GFR to increase.  Will  await BMP results.  He has renal follow-up in the next 2 weeks    4. Liver cirrhosis   LFTs normal except for low albumin, INR 1.2, NH 4 at 161 from 63.  We increased lactulose 45 mL 3 times daily, no hepatic encephalopathy.    5. AIDS   stable on ART, we will need to make sure the patient has a 6 month follow-up with ID    6. Type 2 diabetes mellitus   blood sugars range of 140-200.   We increased Lantus to 12 units daily, continue NovoLog sliding scale    7. Chronic pain   as above discussed pain management extensively   8. Essential hypertension   blood pressures are all stable          HISTORY OF PRESENT ILLNESS:   Hosea Garrison is a 56 y.o. male with a history of anal squamous cell cancer status post resection, colostomy, diverting urostomy, kidney cancer status post nephrectomy, liver cirrhosis with recurrent ascites and paracentesis, hepatitis B, HIV/AIDS, type 2 diabetes, hypertension, chronic pain who was just recently discharged from [facility] but fell in his home and injured his right knee.  X-ray showed transverse comminuted impacted fracture of the proximal tibial metaphysis.  Because of his significant amount of pain, he was found to have compartment syndrome and he was taken to the OR emergently by orthopedic surgeon and underwent fasciotomy of the right lower leg on 4/6/17.  He then underwent irrigation and debridement with wound reclosure of the right leg on 4/7/17.  The patient suffered from acute blood loss anemia and received 1 unit packed RBC perioperatively.  On the day prior to his discharge he developed some bleeding at the junction of colostomy site which resolved with pressure and placement of a new pouch.  He was seen by ostomy nurse.  He has not had any recurrence of his bleeding.    Currently, he states that his pain on his right leg has improved on a fentanyl patch and oxycodone but states that occasionally oxycodone does not work despite big doses.  He is now on decreased  frequency of oxycodone and he states that he is able to tolerate twice daily-3 times daily dosing for pain management.  He is now on oxycodone 10-15 mg 3 times daily as needed.  States that his lethargy and sleepiness has improved after increasing lactulose especially during the day.  His NH 4 is higher at 166 from 63 and his GFR is at 12.  He also has pancytopenia with hemoglobin at 8.3, WBC 2.2, platelet 92.  He notes that his legs are less swollen and his abdomen has slight decrease in abdominal girth.  He states that getting metolazone 2.5 mg once a week is helping control his fluid retention.  He does not have any chest pains or shortness of breath.  He was also started on aranesp and had another injection next week.  His most recent hemoglobin on 5/18 is 8.3.  He denies any nausea or vomiting, abdominal pain..  He also denies any fevers or chills.  He has been seen by orthopedics, and he is now 100% weightbearing as tolerated and walks with a walker.  He is also starting stairs training.  His blood sugars are now improving after we have decreased Lantus but he does not have any hypo-glycemic events.  He is on Lantus and NovoLog sliding scale.    Other review of systems are negative.      PAST MEDICAL/SURGICAL HISTORY:  Past Medical History:   Diagnosis Date     AIDS (acquired immune deficiency syndrome)      Cancer     History of Kidney & Rectal Cancer     Chronic renal failure      Cirrhosis      Colostomy in place      Diabetes mellitus     Type II     History of urinary tract infection      Hx of fever      Hydronephrosis      Hypertension      Ileostomy in place      Lower GI bleed      Thrombocytopenia      Past Surgical History:   Procedure Laterality Date     COLON SURGERY       COLOSTOMY Left      FASCIOTOMY Right 4/6/2017    Procedure: FASCIOTOMY RIGHT LOWER LEG;  Surgeon: Quentin Landis MD;  Location: [facility];  Service:      FRACTURE SURGERY Right     right knee and right ankle surgery      ILEOSTOMY Right      PICC  6/18/2015            SOCIAL HISTORY: He is single and lives alone in his own apartment, he has been in a relationship with his partner for 30 years although they are not living together currently.  He is a smoker, one fourth pack per day for 30 years although he states that his last cigarette was 3 months ago.  He quit alcohol many years ago.      MEDICATIONS:  Reviewed per TCU orders summary    ALLERGIES:  Allergies   Allergen Reactions     Compazine [Prochlorperazine]      Parkinson's type symptoms     Sulfacetamide Other (See Comments)     Patient can't remember         PHYSICAL EXAMINATION:  Vital signs 124/71, 97.1, 86, 20, 99%  General: Alert, oriented x3, not in any form of acute distress, answers questions appropriately, follows simple commands, less conversant, pale, has a sad affect  HEENT: Pale conjunctiva, anicteric sclerae, oral mucosa is moist, oral thrush resolved, no upper or lower teeth   NECK: Supple, without any lymphadenopathy, thyromegaly or any masses  BACK: No kyphosis of the thoracic spine  ABDOMEN: Globular with mildly increased abdominal girth/ascites, soft, nontender to palpation, no organomegaly,  colostomy, site is clean, ileostomy conduit with clear urine per catheter  EXTREMITIES: Good range of motion on both upper and lower extremities except on right lower extremity which is wrapped in a hinged brace, able to wiggle right toes, left leg with 1+ pedal edema, no cyanosis or clubbing, no calf tenderness, right arm AV fistula with good bruits  SKIN: Warm and dry, no rashes or erythema noted    LABS: Reviewed      35 minutes TT spent, >50% in coordination of care and counseling regarding the above medical issues and plan of care.  A long discussion was done with patient and nursing staff regarding above problems.  I reviewed medications/labs/available medical records.    Electronically signed by:Zeynep Mijares MD

## 2021-06-11 NOTE — PROGRESS NOTES
[facility]        Visit Type: Review Of Multiple Medical Conditions (Multiple Medical issues)    Code Status:  FULL CODE  Facility:  [facility] [[id number]]          PCP: No Primary Care Provider       PHONE: None     FAX:[phone]        ASSESSMENT/PLAN:  1. CRD (chronic renal disease), stage 5     2. Liver cirrhosis     3. Type 2 diabetes mellitus     4. Chronic pain syndrome     5. Traumatic compartment syndrome of right lower extremity, initial encounter     6. Essential hypertension     7. Ileostomy in place     8. AIDS     9. Anemia       1. Compartment syndrome, traumatic, lower extremity   status post fasciotomy and incision and drainage of wound, also has a right tibial fracture now with intermittent pain.  A long discussion was done with the patient regarding his pain medications.  He is on a fentanyl patch and we further decreased oxycodone to 10-15 mg every 12 hours as needed.  We did discuss risks and benefits of narcotic use.  Ortho follow up done and he could do 100% weightbearing as tolerated.  He will need further rehab as he has 3 flights of steps to navigate when he goes home.  OT involved to assess ADLs for return home with stairs.     2. Acute blood loss anemia   now with pancytopenia, hemoglobin low at 8.3 from 7.6, no evidence of ongoing blood loss.  Pancytopenia is multifactorial with acute blood loss anemia from surgery, CRD stage V, liver cirrhosis, HIV/AIDS.  Aranesp has been started by renal MD and he has received RNS in our facility every 2 weeks.  He also completed iron infusion, i.e. Venofer ×2 doses    3. CRD (chronic renal disease), stage 5   GFR is lower at 13 from 17 with creatinine at 4.81 from 3.81, and BUN 91 from 60.  Review of his previous BMP is showed that this high levels are his baseline.  Will follow multiple recommendations from renal MD. will discontinue metolazone at 2.5 mg weekly and his renal  MD has increased his torsemide to 20 mg twice daily,  will await BMP results.     4. Liver cirrhosis   LFTs normal except for low albumin, INR 1.2, NH 4 at 180 from 161 from 63.  We increased lactulose 45 mL 3 times daily, no hepatic encephalopathy.  Recheck NH4 , LFT's next week   5. AIDS   stable on ART, we will need to make sure the patient has a 6 month follow-up with ID    6. Type 2 diabetes mellitus   blood sugars better range of 124-199.   Now on insulin detemir to 15 units daily, continue NovoLog sliding scale    7. Chronic pain   as above discussed pain management extensively   8. Essential hypertension   blood pressures are all stable          HISTORY OF PRESENT ILLNESS:   Hosea Garrison is a 56 y.o. male with a history of anal squamous cell cancer status post resection, colostomy, diverting urostomy, kidney cancer status post nephrectomy, liver cirrhosis with recurrent ascites and paracentesis, hepatitis B, HIV/AIDS, type 2 diabetes, hypertension, chronic pain who was just recently discharged from [facility] but fell in his home and injured his right knee.  X-ray showed transverse comminuted impacted fracture of the proximal tibial metaphysis.  Because of his significant amount of pain, he was found to have compartment syndrome and he was taken to the OR emergently by orthopedic surgeon and underwent fasciotomy of the right lower leg on 4/6/17.  He then underwent irrigation and debridement with wound reclosure of the right leg on 4/7/17.  The patient suffered from acute blood loss anemia and received 1 unit packed RBC perioperatively.  On the day prior to his discharge he developed some bleeding at the junction of colostomy site which resolved with pressure and placement of a new pouch.  He was seen by ostomy nurse.  He has not had any recurrence of his bleeding.    Currently, he states that his pain on his right leg has improved on a fentanyl patch and oxycodone but states that occasionally  oxycodone does not work despite big doses.  He is now on decreased frequency of oxycodone and he states that he is able to tolerate twice daily-3 times daily dosing for pain management.  He is now on oxycodone 10-15 mg 3 times daily as needed.  States that his lethargy and sleepiness has improved after increasing lactulose especially during the day.  His NH 4 is higher at 180 [id number] from 63 and his GFR is at 12.  He also has pancytopenia with hemoglobin at 8.3, WBC 2.2, platelet 92.  He is now receiving Aranesp every 2 weeks.  We reviewed notes from renal MD and medication changes discussed with patient today he notes that his legs are less swollen and his abdomen has slight decrease in abdominal girth.  He states that getting increased dose of torsemide helped, his metolazone has been discontinued.  He does not have any chest pains or shortness of breath.   He denies any nausea or vomiting, abdominal pain..  He also denies any fevers or chills.  He has been seen by orthopedics, and he is now 100% weightbearing as tolerated and walks with a walker.  He is also starting stairs training.  His blood sugars are now improving after we have increased Lantus.  He is on Lantus and NovoLog sliding scale.    Other review of systems are negative.      PAST MEDICAL/SURGICAL HISTORY:  Past Medical History:   Diagnosis Date     AIDS (acquired immune deficiency syndrome)      Cancer     History of Kidney & Rectal Cancer     Chronic renal failure      Cirrhosis      Colostomy in place      Diabetes mellitus     Type II     History of urinary tract infection      Hx of fever      Hydronephrosis      Hypertension      Ileostomy in place      Lower GI bleed      Thrombocytopenia      Past Surgical History:   Procedure Laterality Date     COLON SURGERY       COLOSTOMY Left      FASCIOTOMY Right 4/6/2017    Procedure: FASCIOTOMY RIGHT LOWER LEG;  Surgeon: Quentin Landis MD;  Location: [facility] OR;  Service:      FRACTURE  SURGERY Right     right knee and right ankle surgery     ILEOSTOMY Right      PICC  6/18/2015            SOCIAL HISTORY: He is single and lives alone in his own apartment, he has been in a relationship with his partner for 30 years although they are not living together currently.  He is a smoker, one fourth pack per day for 30 years although he states that his last cigarette was 3 months ago.  He quit alcohol many years ago.      MEDICATIONS:  Reviewed per TCU orders summary    ALLERGIES:  Allergies   Allergen Reactions     Compazine [Prochlorperazine]      Parkinson's type symptoms     Sulfacetamide Other (See Comments)     Patient can't remember         PHYSICAL EXAMINATION:  Vital signs 117/66, 96.6, 74, 20, 100%  General: Alert, oriented x3, not in any form of acute distress, answers questions appropriately, follows simple commands, less conversant, pale, has a sad affect  HEENT: Pale conjunctiva, anicteric sclerae, oral mucosa is moist, oral thrush resolved, no upper or lower teeth   NECK: Supple, without any lymphadenopathy, thyromegaly or any masses  BACK: No kyphosis of the thoracic spine  ABDOMEN: Globular with mildly increased abdominal girth/ascites, soft, nontender to palpation, no organomegaly,  colostomy, site is clean, ileostomy conduit with clear urine per catheter  EXTREMITIES: Good range of motion on both upper and lower extremities except on right lower extremity which is wrapped in a hinged brace, able to wiggle right toes, left leg with 1+ pedal edema, no cyanosis or clubbing, no calf tenderness, right arm AV fistula with good bruits  SKIN: Warm and dry, no rashes or erythema noted    LABS: Reviewed    Electronically signed by:Zeynep Mijares MD

## 2021-06-11 NOTE — PROGRESS NOTES
[facility]        Visit Type: Review Of Multiple Medical Conditions (Multiple medical issues)    Code Status:  FULL CODE  Facility:  [facility] [[id number]]          PCP: No Primary Care Provider       PHONE: None     FAX:[phone]        ASSESSMENT/PLAN:  1. CRD (chronic renal disease), stage 5     2. Liver cirrhosis     3. Type 2 diabetes mellitus     4. Chronic pain syndrome     5. Traumatic compartment syndrome of right lower extremity, initial encounter     6. Essential hypertension     7. Ileostomy in place     8. AIDS       1. Compartment syndrome, traumatic, lower extremity   status post fasciotomy and incision and drainage of wound, also has a right tibial fracture now with intermittent pain.  A long discussion was done with the patient regarding his pain medications.  He is on a fentanyl patch and we have decreased oxycodone to 10-15 mg every 8 hours as needed.  We did discuss risks and benefits of narcotic use.  Ortho follow up done and he could do 100% weightbearing as tolerated.  He will need further rehab as he has 3 flights of steps to navigate when he goes home.  We will get OT involved to assess ADLs for return home with stairs.     2. Acute blood loss anemia   now with pancytopenia, hemoglobin low at 8.3 from 7.6, no evidence of ongoing blood loss.  Pancytopenia is multifactorial with acute blood loss anemia from surgery, CRD stage V, liver cirrhosis, HIV/AIDS.  Aranesp has been started by renal MD and he has received RNS in our facility every 2 weeks.  He also completed iron infusion, i.e. Venofer ×2 doses    3. CRD (chronic renal disease), stage 5   GFR is lower at 13 from 17 with creatinine at 4.81 from 3.81, and BUN 91 from 60.  Review of his previous BMP is showed that this high levels are his baseline.  Will follow multiple recommendations from renal MD.  He is now on metolazone at 2.5 mg weekly and we would expect GFR  to increase.  Will await BMP results.  He has renal follow-up next week    4. Liver cirrhosis   LFTs normal except for low albumin, INR 1.2, NH 4 at 161 from 63.  We increased lactulose 45 mL 3 times daily, no hepatic encephalopathy.  Check NH4   5. AIDS   stable on ART, we will need to make sure the patient has a 6 month follow-up with ID    6. Type 2 diabetes mellitus   blood sugars range of 194-283.   We will increase insulin detemir to 15 units daily, continue NovoLog sliding scale    7. Chronic pain   as above discussed pain management extensively   8. Essential hypertension   blood pressures are all stable          HISTORY OF PRESENT ILLNESS:   Hosea Garrison is a 56 y.o. male with a history of anal squamous cell cancer status post resection, colostomy, diverting urostomy, kidney cancer status post nephrectomy, liver cirrhosis with recurrent ascites and paracentesis, hepatitis B, HIV/AIDS, type 2 diabetes, hypertension, chronic pain who was just recently discharged from [facility] but fell in his home and injured his right knee.  X-ray showed transverse comminuted impacted fracture of the proximal tibial metaphysis.  Because of his significant amount of pain, he was found to have compartment syndrome and he was taken to the OR emergently by orthopedic surgeon and underwent fasciotomy of the right lower leg on 4/6/17.  He then underwent irrigation and debridement with wound reclosure of the right leg on 4/7/17.  The patient suffered from acute blood loss anemia and received 1 unit packed RBC perioperatively.  On the day prior to his discharge he developed some bleeding at the junction of colostomy site which resolved with pressure and placement of a new pouch.  He was seen by ostomy nurse.  He has not had any recurrence of his bleeding.    Currently, he states that his pain on his right leg has improved on a fentanyl patch and oxycodone but states that occasionally oxycodone does not work despite big  doses.  He is now on decreased frequency of oxycodone and he states that he is able to tolerate twice daily-3 times daily dosing for pain management.  He is now on oxycodone 10-15 mg 3 times daily as needed.  States that his lethargy and sleepiness has improved after increasing lactulose especially during the day.  His NH 4 is higher at 166 from 63 and his GFR is at 12.  He also has pancytopenia with hemoglobin at 8.3, WBC 2.2, platelet 92.  He is now receiving Aranesp every 2 weeks.  He notes that his legs are less swollen and his abdomen has slight decrease in abdominal girth.  He states that getting metolazone 2.5 mg once a week is helping control his fluid retention.  He does not have any chest pains or shortness of breath.   He denies any nausea or vomiting, abdominal pain..  He also denies any fevers or chills.  He has been seen by orthopedics, and he is now 100% weightbearing as tolerated and walks with a walker.  He is also starting stairs training.  His blood sugars are now increasing after we have decreased Lantus for hypoglycemia.  He is on Lantus and NovoLog sliding scale.    Other review of systems are negative.      PAST MEDICAL/SURGICAL HISTORY:  Past Medical History:   Diagnosis Date     AIDS (acquired immune deficiency syndrome)      Cancer     History of Kidney & Rectal Cancer     Chronic renal failure      Cirrhosis      Colostomy in place      Diabetes mellitus     Type II     History of urinary tract infection      Hx of fever      Hydronephrosis      Hypertension      Ileostomy in place      Lower GI bleed      Thrombocytopenia      Past Surgical History:   Procedure Laterality Date     COLON SURGERY       COLOSTOMY Left      FASCIOTOMY Right 4/6/2017    Procedure: FASCIOTOMY RIGHT LOWER LEG;  Surgeon: Quentin Landis MD;  Location: [facility];  Service:      FRACTURE SURGERY Right     right knee and right ankle surgery     ILEOSTOMY Right      PICC  6/18/2015            SOCIAL  HISTORY: He is single and lives alone in his own apartment, he has been in a relationship with his partner for 30 years although they are not living together currently.  He is a smoker, one fourth pack per day for 30 years although he states that his last cigarette was 3 months ago.  He quit alcohol many years ago.      MEDICATIONS:  Reviewed per TCU orders summary    ALLERGIES:  Allergies   Allergen Reactions     Compazine [Prochlorperazine]      Parkinson's type symptoms     Sulfacetamide Other (See Comments)     Patient can't remember         PHYSICAL EXAMINATION:  Vital signs 108/58, 96.0, 76, 20, 99%, 170.4 pounds  General: Alert, oriented x3, not in any form of acute distress, answers questions appropriately, follows simple commands, less conversant, pale, has a sad affect  HEENT: Pale conjunctiva, anicteric sclerae, oral mucosa is moist, oral thrush resolved, no upper or lower teeth   NECK: Supple, without any lymphadenopathy, thyromegaly or any masses  BACK: No kyphosis of the thoracic spine  ABDOMEN: Globular with mildly increased abdominal girth/ascites, soft, nontender to palpation, no organomegaly,  colostomy, site is clean, ileostomy conduit with clear urine per catheter  EXTREMITIES: Good range of motion on both upper and lower extremities except on right lower extremity which is wrapped in a hinged brace, able to wiggle right toes, left leg with 1+ pedal edema, no cyanosis or clubbing, no calf tenderness, right arm AV fistula with good bruits  SKIN: Warm and dry, no rashes or erythema noted    LABS: Reviewed      35 minutes TT spent, >50% in coordination of care and counseling regarding the above medical issues and plan of care.  A long discussion was done with patient and nursing staff regarding above problems.  I reviewed medications/labs/available medical records.    Electronically signed by:Zeynep Mijares MD

## 2021-06-11 NOTE — PROGRESS NOTES
[facility]        Visit Type: Review Of Multiple Medical Conditions (Multiple medical issues)    Code Status:  FULL CODE  Facility:  [facility] [[id number]]          PCP: No Primary Care Provider       PHONE: None     FAX:[phone]        ASSESSMENT/PLAN:  1. CRD (chronic renal disease), stage 5     2. Liver cirrhosis     3. Type 2 diabetes mellitus     4. Chronic pain syndrome     5. Traumatic compartment syndrome of right lower extremity, initial encounter     6. Essential hypertension     7. Ileostomy in place     8. AIDS     9. Anemia       1. Compartment syndrome, traumatic, lower extremity   status post fasciotomy and incision and drainage of wound, also has a right tibial fracture now with intermittent pain.  A long discussion was done with the patient regarding his pain medications.  He is on a fentanyl patch and we further decreased oxycodone to 10-15 mg every 12 hours as needed.  We did discuss risks and benefits of narcotic use.  Ortho follow up done and he could do 100% weightbearing as tolerated.  He will need further rehab as he has 3 flights of steps to navigate when he goes home.  OT involved to assess ADLs for return home with stairs.     2. Acute blood loss anemia   now with pancytopenia, hemoglobin low at 8.3 from 7.6, no evidence of ongoing blood loss.  Pancytopenia is multifactorial with acute blood loss anemia from surgery, CRD stage V, liver cirrhosis, HIV/AIDS.  Aranesp has been started by renal MD and he has received RNS in our facility every 2 weeks.  He also completed iron infusion, i.e. Venofer ×2 doses    3. CRD (chronic renal disease), stage 5   GFR is lower at 12 from 17 with creatinine at 4.85 from 3.81, and BUN 95 from 60.  Review of his previous BMP is showed that this high levels are his baseline.  Will follow multiple recommendations from renal MD. will discontinue metolazone at 2.5 mg weekly and his renal  MD has increased his torsemide to 20 mg twice daily,  will await BMP results.     4. Liver cirrhosis   LFTs normal except for low albumin, INR 1.2, NH 4 at 233 from 180 from 161 from 63.  We increased lactulose 45 mL 3 times daily, no hepatic encephalopathy.  Recheck NH4 , LFT's next week   5. AIDS   stable on ART, we will need to make sure the patient has a 6 month follow-up with ID    6. Type 2 diabetes mellitus   blood sugars better range of 119-203.   Now on insulin detemir to 15 units daily, continue NovoLog sliding scale    7. Chronic pain   as above discussed pain management extensively   8. Essential hypertension   blood pressures are all stable    9.      Tobacco abuse- long discussion done with patient regarding smoking cessation.  Since the PCU facility is a non-smoking facility,  is now involved for possible other placement issues for this patient      HISTORY OF PRESENT ILLNESS:   Hosea Garrison is a 56 y.o. male with a history of anal squamous cell cancer status post resection, colostomy, diverting urostomy, kidney cancer status post nephrectomy, liver cirrhosis with recurrent ascites and paracentesis, hepatitis B, HIV/AIDS, type 2 diabetes, hypertension, chronic pain who was just recently discharged from [facility] but fell in his home and injured his right knee.  X-ray showed transverse comminuted impacted fracture of the proximal tibial metaphysis.  Because of his significant amount of pain, he was found to have compartment syndrome and he was taken to the OR emergently by orthopedic surgeon and underwent fasciotomy of the right lower leg on 4/6/17.  He then underwent irrigation and debridement with wound reclosure of the right leg on 4/7/17.  The patient suffered from acute blood loss anemia and received 1 unit packed RBC perioperatively.  On the day prior to his discharge he developed some bleeding at the junction of colostomy site which resolved with pressure and placement of  a new pouch.  He was seen by ostomy nurse.  He has not had any recurrence of his bleeding.    Currently, he states that his pain on his right leg has improved on a fentanyl patch and oxycodone but states that occasionally oxycodone does not work despite big doses.  He is now on decreased frequency of oxycodone and he states that he is able to tolerate twice daily-3 times daily dosing for pain management.  He is now on oxycodone 10-15 mg 3 times daily as needed.  States that his lethargy and sleepiness has improved after increasing lactulose especially during the day.  His NH 4 is higher at 233 from 180 [id number] from 63 and his GFR is at 12.  He also has pancytopenia with hemoglobin at 8.3, WBC 2.2, platelet 92.  He is now receiving Aranesp every 2 weeks.  We reviewed notes from renal MD and medication changes discussed with patient today he notes that his legs are less swollen and his abdomen has slight decrease in abdominal girth.  He states that getting increased dose of torsemide helped, his metolazone has been discontinued.  He does not have any chest pains or shortness of breath.   He denies any nausea or vomiting, abdominal pain..  He also denies any fevers or chills.  He has been seen by orthopedics, and he is now 100% weightbearing as tolerated and walks with a walker.  He is also starting stairs training.  His blood sugars are now improving after we have increased Lantus.  He is on Lantus and NovoLog sliding scale.    Long discussion today was done with the patient and his  regarding his smoking issues.  He again refused to give up smoking and does not want any other treatment for his smoking addiction at this time.    Other review of systems are negative.      PAST MEDICAL/SURGICAL HISTORY:  Past Medical History:   Diagnosis Date     AIDS (acquired immune deficiency syndrome)      Cancer     History of Kidney & Rectal Cancer     Chronic renal failure      Cirrhosis      Colostomy in place       Diabetes mellitus     Type II     History of urinary tract infection      Hx of fever      Hydronephrosis      Hypertension      Ileostomy in place      Lower GI bleed      Thrombocytopenia      Past Surgical History:   Procedure Laterality Date     COLON SURGERY       COLOSTOMY Left      FASCIOTOMY Right 4/6/2017    Procedure: FASCIOTOMY RIGHT LOWER LEG;  Surgeon: Quentin Landis MD;  Location: [facility];  Service:      FRACTURE SURGERY Right     right knee and right ankle surgery     ILEOSTOMY Right      PICC  6/18/2015            SOCIAL HISTORY: He is single and lives alone in his own apartment, he has been in a relationship with his partner for 30 years although they are not living together currently.  He is a smoker, one fourth pack per day for 30 years although he states that his last cigarette was 3 months ago.  He quit alcohol many years ago.      MEDICATIONS:  Reviewed per TCU orders summary    ALLERGIES:  Allergies   Allergen Reactions     Compazine [Prochlorperazine]      Parkinson's type symptoms     Sulfacetamide Other (See Comments)     Patient can't remember         PHYSICAL EXAMINATION:  Vital signs 113/53, 96.8, 73, 18, 95%  General: Alert, oriented x3, not in any form of acute distress, answers questions appropriately, follows simple commands, less conversant, pale, has a sad affect  HEENT: Pale conjunctiva, anicteric sclerae, oral mucosa is moist, oral thrush resolved, no upper or lower teeth   NECK: Supple, without any lymphadenopathy, thyromegaly or any masses  BACK: No kyphosis of the thoracic spine  ABDOMEN: Globular with mildly increased abdominal girth/ascites, soft, nontender to palpation, no organomegaly,  colostomy, site is clean, ileostomy conduit with clear urine per catheter  EXTREMITIES: Good range of motion on both upper and lower extremities except on right lower extremity which is wrapped in a hinged brace, able to wiggle right toes, left leg with 1+ pedal edema, no  cyanosis or clubbing, no calf tenderness, right arm AV fistula with good bruits  SKIN: Warm and dry, no rashes or erythema noted    LABS: Reviewed      35 minutes TT spent, >50% in coordination of care and counseling regarding the above medical issues and plan of care.  A long discussion was done with patient and nursing staff regarding above problems.  I reviewed medications/labs/available medical records.    Electronically signed by:Zeynep Mijares MD

## 2021-06-11 NOTE — PROGRESS NOTES
[facility]        Visit Type: Review Of Multiple Medical Conditions (Multiple medical issues)    Code Status:  FULL CODE  Facility:  [facility] [[id number]]          PCP: No Primary Care Provider       PHONE: None     FAX:[phone]        ASSESSMENT/PLAN:  1. CRD (chronic renal disease), stage 5     2. Liver cirrhosis     3. Type 2 diabetes mellitus     4. Chronic pain syndrome     5. Traumatic compartment syndrome of right lower extremity, initial encounter     6. Essential hypertension     7. Ileostomy in place     8. AIDS     9. Anemia       1. Compartment syndrome, traumatic, lower extremity   status post fasciotomy and incision and drainage of wound, also has a right tibial fracture now with intermittent pain.  A long discussion was done with the patient regarding his pain medications.  He is on a fentanyl patch and we will further decrease oxycodone to 10-15 mg every 12 hours as needed.  We did discuss risks and benefits of narcotic use.  Ortho follow up done and he could do 100% weightbearing as tolerated.  He will need further rehab as he has 3 flights of steps to navigate when he goes home.  OT involved to assess ADLs for return home with stairs.     2. Acute blood loss anemia   now with pancytopenia, hemoglobin low at 8.3 from 7.6, no evidence of ongoing blood loss.  Pancytopenia is multifactorial with acute blood loss anemia from surgery, CRD stage V, liver cirrhosis, HIV/AIDS.  Aranesp has been started by renal MD and he has received RNS in our facility every 2 weeks.  He also completed iron infusion, i.e. Venofer ×2 doses    3. CRD (chronic renal disease), stage 5   GFR is lower at 13 from 17 with creatinine at 4.81 from 3.81, and BUN 91 from 60.  Review of his previous BMP is showed that this high levels are his baseline.  Will follow multiple recommendations from renal MD. will discontinue metolazone at 2.5 mg weekly and his  renal MD has increased his torsemide to 20 mg twice daily,  will await BMP results.     4. Liver cirrhosis   LFTs normal except for low albumin, INR 1.2, NH 4 at 180 from 161 from 63.  We increased lactulose 45 mL 3 times daily, no hepatic encephalopathy.  Recheck NH4 , LFT's next week   5. AIDS   stable on ART, we will need to make sure the patient has a 6 month follow-up with ID    6. Type 2 diabetes mellitus   blood sugars better range of 101-167.   Now on insulin detemir to 15 units daily, continue NovoLog sliding scale    7. Chronic pain   as above discussed pain management extensively   8. Essential hypertension   blood pressures are all stable          HISTORY OF PRESENT ILLNESS:   Hosea Garrison is a 56 y.o. male with a history of anal squamous cell cancer status post resection, colostomy, diverting urostomy, kidney cancer status post nephrectomy, liver cirrhosis with recurrent ascites and paracentesis, hepatitis B, HIV/AIDS, type 2 diabetes, hypertension, chronic pain who was just recently discharged from [facility] but fell in his home and injured his right knee.  X-ray showed transverse comminuted impacted fracture of the proximal tibial metaphysis.  Because of his significant amount of pain, he was found to have compartment syndrome and he was taken to the OR emergently by orthopedic surgeon and underwent fasciotomy of the right lower leg on 4/6/17.  He then underwent irrigation and debridement with wound reclosure of the right leg on 4/7/17.  The patient suffered from acute blood loss anemia and received 1 unit packed RBC perioperatively.  On the day prior to his discharge he developed some bleeding at the junction of colostomy site which resolved with pressure and placement of a new pouch.  He was seen by ostomy nurse.  He has not had any recurrence of his bleeding.    Currently, he states that his pain on his right leg has improved on a fentanyl patch and oxycodone but states that occasionally  oxycodone does not work despite big doses.  He is now on decreased frequency of oxycodone and he states that he is able to tolerate twice daily-3 times daily dosing for pain management.  He is now on oxycodone 10-15 mg 3 times daily as needed.  States that his lethargy and sleepiness has improved after increasing lactulose especially during the day.  His NH 4 is higher at 180 [id number] from 63 and his GFR is at 12.  He also has pancytopenia with hemoglobin at 8.3, WBC 2.2, platelet 92.  He is now receiving Aranesp every 2 weeks.  We reviewed notes from renal MD and medication changes discussed with patient today he notes that his legs are less swollen and his abdomen has slight decrease in abdominal girth.  He states that getting metolazone 2.5 mg once a week is helping control his fluid retention.  He does not have any chest pains or shortness of breath.   He denies any nausea or vomiting, abdominal pain..  He also denies any fevers or chills.  He has been seen by orthopedics, and he is now 100% weightbearing as tolerated and walks with a walker.  He is also starting stairs training.  His blood sugars are now improving after we have increased Lantus.  He is on Lantus and NovoLog sliding scale.    Other review of systems are negative.      PAST MEDICAL/SURGICAL HISTORY:  Past Medical History:   Diagnosis Date     AIDS (acquired immune deficiency syndrome)      Cancer     History of Kidney & Rectal Cancer     Chronic renal failure      Cirrhosis      Colostomy in place      Diabetes mellitus     Type II     History of urinary tract infection      Hx of fever      Hydronephrosis      Hypertension      Ileostomy in place      Lower GI bleed      Thrombocytopenia      Past Surgical History:   Procedure Laterality Date     COLON SURGERY       COLOSTOMY Left      FASCIOTOMY Right 4/6/2017    Procedure: FASCIOTOMY RIGHT LOWER LEG;  Surgeon: Quentin Landis MD;  Location: [facility];  Service:      FRACTURE SURGERY  Right     right knee and right ankle surgery     ILEOSTOMY Right      PICC  6/18/2015            SOCIAL HISTORY: He is single and lives alone in his own apartment, he has been in a relationship with his partner for 30 years although they are not living together currently.  He is a smoker, one fourth pack per day for 30 years although he states that his last cigarette was 3 months ago.  He quit alcohol many years ago.      MEDICATIONS:  Reviewed per TCU orders summary    ALLERGIES:  Allergies   Allergen Reactions     Compazine [Prochlorperazine]      Parkinson's type symptoms     Sulfacetamide Other (See Comments)     Patient can't remember         PHYSICAL EXAMINATION:  Vital signs 92/45, 95.3, 73, 17, 98%  General: Alert, oriented x3, not in any form of acute distress, answers questions appropriately, follows simple commands, less conversant, pale, has a sad affect  HEENT: Pale conjunctiva, anicteric sclerae, oral mucosa is moist, oral thrush resolved, no upper or lower teeth   NECK: Supple, without any lymphadenopathy, thyromegaly or any masses  BACK: No kyphosis of the thoracic spine  ABDOMEN: Globular with mildly increased abdominal girth/ascites, soft, nontender to palpation, no organomegaly,  colostomy, site is clean, ileostomy conduit with clear urine per catheter  EXTREMITIES: Good range of motion on both upper and lower extremities except on right lower extremity which is wrapped in a hinged brace, able to wiggle right toes, left leg with 1+ pedal edema, no cyanosis or clubbing, no calf tenderness, right arm AV fistula with good bruits  SKIN: Warm and dry, no rashes or erythema noted    LABS: Reviewed      35 minutes TT spent, >50% in coordination of care and counseling regarding the above medical issues and plan of care.  A long discussion was done with patient and nursing staff regarding above problems.  I reviewed medications/labs/available medical records.    Electronically signed by:Zeynep Marshall  MD Dyllan

## 2021-06-11 NOTE — PROGRESS NOTES
[facility]    DATE: 2017    NAME:  Hosea Garrison             :  1960  MRN: [id number]  CODE STATUS:  FULL CODE    FACILITY:  [facility] [[id number]]       ROOM:   [id number]    CHIEF COMPLAINT/REASON FOR VISIT:  Chief Complaint   Patient presents with     Problem Visit     Liver Cirrhosis and Pain management     HISTORY OF PRESENT ILLNESS: Hosea Garrison is a 56 y.o. 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  X-ray showed transverse comminuted impacted fracture of the proximal tibial metaphysis.  Because of his significant amount of pain, he was found to have compartment syndrome and was taken to the OR emergently by the orthopedic surgeon and underwent fasciotomy of the right lower leg on 17.  He then underwent irrigation and debridement with wound reclosure of the right leg on 17.  The patient suffered from acute blood loss anemia and received 1 unit packed RBC, perioperatively.  On the day prior to his discharge he developed some bleeding at the junction of his colostomy site which resolved with pressure and placement of a new pouch.  He was seen by the ostomy nurse.  He has not had any recurrence of his bleeding.    Today, patient is seen at the bedside in his wheelchair.  He states his pain is well controlled on Fentanyl patch and Oxycodone. He is participating in therapy.  He continues to be nonweightbearing. Patient states his lethargy and sleepiness has improved after recent increase in Lactulose.  We will continue Lantus and Novolog insulin for his Diabetes Mellitus.    Past Medical History:   Diagnosis Date     AIDS (acquired  immune deficiency syndrome)      Cancer     History of Kidney & Rectal Cancer     Chronic renal failure      Cirrhosis      Colostomy in place      Diabetes mellitus     Type II     History of urinary tract infection      Hx of fever      Hydronephrosis      Hypertension      Ileostomy in place      Lower GI bleed      Thrombocytopenia      Past Surgical History:   Procedure Laterality Date     COLON SURGERY       COLOSTOMY Left      FASCIOTOMY Right 4/6/2017    Procedure: FASCIOTOMY RIGHT LOWER LEG;  Surgeon: Quentin Landis MD;  Location: [facility];  Service:      FRACTURE SURGERY Right     right knee and right ankle surgery     ILEOSTOMY Right      PICC  6/18/2015          No family history on file.     Social History     Social History     Marital status: Single     Spouse name: N/A     Number of children: N/A     Years of education: N/A     Occupational History     Not on file.     Social History Main Topics     Smoking status: Current Every Day Smoker     Packs/day: 0.25     Years: 30.00     Smokeless tobacco: Not on file     Alcohol use No      Comment: patient reports very rarely     Drug use: No      Comment: denies     Sexual activity: No     Other Topics Concern     Not on file     Social History Narrative     Allergies   Allergen Reactions     Compazine [Prochlorperazine]      Parkinson's type symptoms     Sulfacetamide Other (See Comments)     Patient can't remember     Current Outpatient Prescriptions   Medication Sig Dispense Refill     abacavir (ZIAGEN) 300 mg tablet Take 300 mg by mouth 2 (two) times a day.       acetaminophen (TYLENOL) 500 MG tablet Take 2 tablets (1,000 mg total) by mouth 3 (three) times a day as needed for pain. 40 tablet 0     albuterol (PROVENTIL HFA;VENTOLIN HFA) 90 mcg/actuation inhaler Inhale 2 puffs every 4 (four) hours as needed for wheezing.       aspirin 325 MG tablet Take 1 tablet (325 mg total) by mouth 2 (two) times a day. 60 tablet 0     citalopram  (CELEXA) 40 MG tablet Take 40 mg by mouth daily.       citric acid-sodium citrate (BICITRA) 500-334 mg/5 mL solution Take 30 mL by mouth 2 (two) times a day. For Chronic metabolic acidosis       ergocalciferol (ERGOCALCIFEROL) 50,000 unit capsule Take 50,000 Units by mouth once a week. Every Saturday       fenofibrate (TRICOR) 48 MG tablet Take 48 mg by mouth daily.       fentaNYL (DURAGESIC) 50 mcg/hr Place 1 patch on the skin every third day. 5 patch 0     ferrous sulfate 325 (65 FE) MG tablet Take 1 tablet by mouth 2 (two) times a day.        gabapentin (NEURONTIN) 300 MG capsule Take 300 mg by mouth bedtime.       insulin detemir (LEVEMIR) 100 unit/mL injection Inject 12 Units under the skin at bedtime.       lactulose 10 gram/15 mL (15 mL) Soln Take 30 mL by mouth 3 (three) times a day.        lamiVUDine (EPIVIR) 100 MG tablet Take 1 tablet (100 mg total) by mouth daily.       loratadine (CLARITIN) 10 mg tablet Take 10 mg by mouth every other day.        LORazepam (ATIVAN) 0.5 MG tablet Take 1 tablet (0.5 mg total) by mouth 2 (two) times a day as needed for anxiety. 30 tablet 0     naphazoline-pheniramine (NAPHCON-A) 0.025-0.3 % ophthalmic solution Administer 1 drop to both eyes every 6 (six) hours as needed.       nevirapine (VIRAMUNE) 200 mg tablet Take 200 mg by mouth 2 (two) times a day.       nicotine polacrilex (COMMIT) 4 MG lozenge Apply 4 mg to the mouth or throat. 1 q 1-2hours (max 5/6hrs, or 20/24hrs).  No chew,cut,crush. Avoid 15 ac or pc.. X 6 weeks.       NOVOLOG 100 unit/mL injection Check blood sugar three (3) times daily.  11.9 Type 2 without complications 10 mL PRN     omeprazole (PRILOSEC) 20 MG capsule Take 20 mg by mouth Daily before breakfast.       ondansetron (ZOFRAN) 4 MG tablet Take 4 mg by mouth every 8 (eight) hours as needed for nausea.        oxyCODONE 10 mg Tab Take 10-15 mg by mouth every 4 (four) hours as needed for pain. 70 tablet 0     QUEtiapine (SEROQUEL) 50 MG tablet Take 50  mg by mouth bedtime.       rifAXIMin (XIFAXAN) 550 mg Tab tablet Take 550 mg by mouth 2 (two) times a day.       senna-docusate (PERICOLACE) 8.6-50 mg tablet Take 1 tablet by mouth daily as needed for constipation.       simethicone (MYLICON) 80 MG chewable tablet Chew 80 mg every 6 (six) hours as needed for flatulence.       sodium bicarbonate 650 MG tablet Take 1 tablet (650 mg total) by mouth 3 (three) times a day.  0     torsemide (DEMADEX) 20 MG tablet Take 20 mg by mouth 2 (two) times a day.       No current facility-administered medications for this visit.      REVIEW OF SYSTEMS    Currently, no fever, chills, or rigors.  He does not have any visual or hearing problems. He denies any chest pain, headaches, palpitations, lightheadedness, dizziness, shortness of breath, or cough. His appetite is good, he denies any GERD symptoms, he denies any difficulty with swallowing, nausea, or vomiting.  He denies any abdominal pain, diarrhea or constipation. He denies any urinary symptoms. No insomnia. No active bleeding. No rash.     PHYSICAL EXAMINATION  Vitals:    06/03/17 2108   BP: 105/61   Pulse: 66   Resp: 20   Temp: (!) 96  F (35.6  C)   SpO2: 99%   Weight: 165 lb 4.8 oz (75 kg)     General: Alert, oriented x3, not in any form of acute distress, answers questions appropriately, follows simple commands, less conversant, pale, has a sad affect  HEENT: Pale conjunctiva, anicteric sclerae, oral mucosa is moist, oral thrush resolved, no upper or lower teeth   NECK: Supple, without any lymphadenopathy, thyromegaly or any masses  BACK: No kyphosis of the thoracic spine  ABDOMEN: Globular with mildly increased abdominal girth/ascites, soft, nontender to palpation, no organomegaly,  colostomy, site is clean, ileostomy conduit with clear urine per catheter  EXTREMITIES: Good range of motion on both upper and lower extremities except on right lower extremity which is wrapped in a long leg cast, able to wiggle right toes,  left leg with 1-2+ pedal edema, no cyanosis or clubbing, no calf tenderness, right arm AV fistula with good bruits  SKIN: Warm and dry, no rashes or erythema noted    LABS:    Lab Results   Component Value Date    WBC 1.9 (LL) 04/12/2017    HGB 8.2 (L) 04/12/2017    HCT 23.8 (L) 04/12/2017    MCV 96 04/12/2017    PLT 93 (L) 04/12/2017     Results for orders placed or performed during the hospital encounter of 04/05/17   Basic Metabolic Panel   Result Value Ref Range    Sodium 135 (L) 136 - 145 mmol/L    Potassium 4.0 3.5 - 5.0 mmol/L    Chloride 107 98 - 107 mmol/L    CO2 17 (L) 22 - 31 mmol/L    Anion Gap, Calculation 11 5 - 18 mmol/L    Glucose 119 70 - 125 mg/dL    Calcium 7.6 (L) 8.5 - 10.5 mg/dL    BUN 73 (H) 8 - 22 mg/dL    Creatinine 4.32 (H) 0.70 - 1.30 mg/dL    GFR MDRD Af Amer 17 (L) >60 mL/min/1.73m2    GFR MDRD Non Af Amer 14 (L) >60 mL/min/1.73m2     Lab Results   Component Value Date    HGBA1C 5.7 08/13/2016     Vitamin D, Total (25-Hydroxy)   Date Value Ref Range Status   12/30/2016 37.0 30.0 - 80.0 ng/mL Final       ASSESSMENT/PLAN:    1. Traumatic compartment syndrome of right lower extremity, subsequent encounter - status post fasciotomy and incision and drainage of wound, also has a right tibial fracture now with intermittent pain.  He is on a Fentanyl patch and Oxycodone to 10-15 mg TID hours as needed.  Ortho follow up done and he could do partial weightbearing up to 50% of body weight.  He will need further rehab as he has 3 flights of steps to navigate throughout his home.  Patient now has a hinged knee brace.   2. Lethargy - Improved with recent increase in Lactulose   3. Anemia - Completed iron infusion, i.e. Venofer × 2 doses.  Now with Pancytopenia, Hemoglobin low at 8.3 from 7.6, and no evidence of ongoing blood loss.  Pancytopenia is multifactorial with acute blood loss anemia from surgery, CRD stage V, Liver cirrhosis, and HIV/AIDS.  Aranesp has been started by renal MD   4. AIDS  -  Stable on ART, we will need to make sure the patient has a 6 month follow-up with ID   5. Chronic pain syndrome  - Will continue current pain medication regimen             Electronically signed by:  Maty Mcknight CNP    35 minutes TT of which 50% was spent in counseling and coordination of care of the above plan.    Time spent in interview and examination of patient, review of available records, and discussion with nursing staff. Continue care plan, efforts at therapy, and monitor nutritional status.

## 2021-06-15 PROBLEM — T79.A29A: Status: ACTIVE | Noted: 2017-01-01

## 2021-06-15 PROBLEM — Z79.891 CHRONIC PRESCRIPTION OPIATE USE: Status: ACTIVE | Noted: 2017-01-01

## 2021-06-15 PROBLEM — E87.20 METABOLIC ACIDOSIS: Status: ACTIVE | Noted: 2017-01-01

## 2021-06-15 PROBLEM — T79.A0XA COMPARTMENT SYNDROME (H): Status: ACTIVE | Noted: 2017-01-01

## 2021-06-15 PROBLEM — N18.5 STAGE 5 CHRONIC KIDNEY DISEASE (H): Status: ACTIVE | Noted: 2017-01-01

## 2021-06-15 PROBLEM — D64.9 ANEMIA: Status: ACTIVE | Noted: 2017-01-01

## 2021-06-16 PROBLEM — N19 RENAL FAILURE: Status: ACTIVE | Noted: 2017-01-01

## 2021-06-16 PROBLEM — Q87.89 HEMORRHAGIC SHOCK AND ENCEPHALOPATHY SYNDROME (H): Status: ACTIVE | Noted: 2017-01-01

## 2021-06-16 PROBLEM — K56.7 ILEUS (H): Status: ACTIVE | Noted: 2017-01-01

## 2021-06-16 PROBLEM — G93.40 HEMORRHAGIC SHOCK AND ENCEPHALOPATHY SYNDROME (H): Status: ACTIVE | Noted: 2017-01-01

## 2021-06-16 PROBLEM — R57.8 HEMORRHAGIC SHOCK AND ENCEPHALOPATHY SYNDROME (H): Status: ACTIVE | Noted: 2017-01-01

## 2021-06-16 PROBLEM — R57.9 SHOCK (H): Status: ACTIVE | Noted: 2017-01-01

## 2021-08-06 NOTE — PROGRESS NOTES
[facility]        Visit Type: Review Of Multiple Medical Conditions (Liver cirrhosis, type 2 diabetes, end-stage renal disease)    Code Status:  FULL CODE  Facility:  [facility] [[id number]]          PCP: No Primary Care Provider       PHONE: None     FAX:[phone]        ASSESSMENT/PLAN:  1. Liver cirrhosis     2. CRD (chronic renal disease), stage 5     3. Type 2 diabetes mellitus     4. Compartment syndrome, traumatic, lower extremity     5. AIDS     6. Chronic pain     7. Essential hypertension       1. Compartment syndrome, traumatic, lower extremity   status post fasciotomy and incision and drainage of wound, also has a right tibial fracture now with intermittent pain.  A long discussion was done with the patient regarding his pain medications.  He is on a fentanyl patch and also on 10-15 mg every 4 hours oxycodone.  We did discuss risks and benefits of narcotic use.  Ortho follow up done.  Patient is now on a long leg cast and will return to or so in 5 weeks time for cast removal and placement of a hinged knee brace    2. Acute blood loss anemia   now with pancytopenia, hemoglobin low at 7.6, no evidence of ongoing blood loss.  Pancytopenia is multifactorial with acute blood loss anemia from surgery, CRD stage V, liver cirrhosis, HIV/AIDS.  Aranesp has been started by renal MD    3. CRD (chronic renal disease), stage 5   GFR is lower at 12 from 17 with creatinine at 4.84 from 3.81, and BUN 81 from 60.  Will follow multiple recommendations from renal MD. his weight has decreased by 3 pounds after aggressive diuresis.  He is now off metolazone and we would expect GFR to increase    4. Liver cirrhosis   LFTs normal except for low albumin, INR 1.2, NH 4 at 61 from 63.  We will increase lactulose 45 mL 3 times daily    5. AIDS   stable on ART, we will need to make sure the patient has a 6 month follow-up with ID    6. Type 2 diabetes  mellitus   check hemoglobin A1c, blood sugars range of 127-190, blood sugars improved with decreasing Lantus to 10 units daily and changing NovoLog sliding scale as follows, less than 180 no insulin sliding scale, 1  units, 221-263 units, 2  units, 301-345 units, 3  units, greater than 408 units.  Hold NovoLog sliding scale for blood sugars less than 100.  Again, long discussion was done regarding hypoglycemia as 1 of the possible causes of his lethargy.  Explained rationality for changing doses of his Lantus and NovoLog sliding scale.     7. Chronic pain   as above discussed pain management    8. Essential hypertension   blood pressures are all stable          HISTORY OF PRESENT ILLNESS:   Hosea Garrison is a 56 y.o. male with a history of anal squamous cell cancer status post resection, colostomy, diverting urostomy, kidney cancer status post nephrectomy, liver cirrhosis with recurrent ascites and paracentesis, hepatitis B, HIV/AIDS, type 2 diabetes, hypertension, chronic pain who was just recently discharged from [facility] but fell in his home and injured his right knee.  X-ray showed transverse comminuted impacted fracture of the proximal tibial metaphysis.  Because of his significant amount of pain, he was found to have compartment syndrome and he was taken to the OR emergently by orthopedic surgeon and underwent fasciotomy of the right lower leg on 4/6/17.  He then underwent irrigation and debridement with wound reclosure of the right leg on 4/7/17.  The patient suffered from acute blood loss anemia and received 1 unit packed RBC perioperatively.  On the day prior to his discharge he developed some bleeding at the junction of colostomy site which resolved with pressure and placement of a new pouch.  He was seen by ostomy nurse.  He has not had any recurrence of his bleeding.    Currently, he states that his pain on his right leg is improving on a fentanyl patch and oxycodone but  states that occasionally oxycodone does not work despite big doses.  States that his lethargy and sleepiness has improved after increasing lactulose especially during the day.  His NH 4 is higher at 161 from 63 and his GFR is at 12.  He also has pancytopenia with hemoglobin at 7.6, WBC 1.8, platelet 104.  He was seen by renal MD and recommendation is to increase torsemide to 20 mg twice daily, plated metoprolol 5 mg daily for 5 days, low-sodium diet and daily weights and 1.5 L water restriction.  His weight has decreased by about 3 pounds.  He was also started on aranesp.  He denies any nausea or vomiting, abdominal pain..  He also denies any fevers or chills, chest pains or shortness of breath.  He states that he still feels weak after his surgery but he is able to participate in physical therapy despite his pain.  His blood sugars have improved after we have decreased Lantus and he does not have any hypo-glycemic events.  He is on Lantus and NovoLog sliding scale.    Other review of systems are negative.      PAST MEDICAL/SURGICAL HISTORY:  Past Medical History:   Diagnosis Date     AIDS (acquired immune deficiency syndrome)      Cancer     History of Kidney & Rectal Cancer     Chronic renal failure      Cirrhosis      Colostomy in place      Diabetes mellitus     Type II     History of urinary tract infection      Hx of fever      Hydronephrosis      Hypertension      Ileostomy in place      Lower GI bleed      Thrombocytopenia      Past Surgical History:   Procedure Laterality Date     COLON SURGERY       COLOSTOMY Left      FASCIOTOMY Right 4/6/2017    Procedure: FASCIOTOMY RIGHT LOWER LEG;  Surgeon: Quentin Landis MD;  Location: [facility];  Service:      FRACTURE SURGERY Right     right knee and right ankle surgery     ILEOSTOMY Right      PICC  6/18/2015            SOCIAL HISTORY: He is single and lives alone in his own apartment, he has been in a relationship with his partner for 30 years  although they are not living together currently.  He is a smoker, one fourth pack per day for 30 years although he states that his last cigarette was 3 months ago.  He quit alcohol many years ago.      MEDICATIONS:  Reviewed per TCU orders summary    ALLERGIES:  Allergies   Allergen Reactions     Compazine [Prochlorperazine]      Parkinson's type symptoms     Sulfacetamide Other (See Comments)     Patient can't remember         PHYSICAL EXAMINATION:  Vital signs 101/52, 97.1, 87, 18, 98%  General: Alert, oriented x3, not in any form of acute distress, answers questions appropriately, follows simple commands, less conversant, pale, has a sad affect  HEENT: Pale conjunctiva, anicteric sclerae, oral mucosa is moist, oral thrush resolved, no upper or lower teeth   NECK: Supple, without any lymphadenopathy, thyromegaly or any masses  LUNG: Clear to auscultation, good chest expansion. There are no crackles, no wheezes, normal AP diameter  BACK: No kyphosis of the thoracic spine  CVS: There is good S1  S2, there are no murmurs, no heaves, rhythm is regular  ABDOMEN: Globular with ascites, soft, nontender to palpation, no organomegaly, good bowel sounds, positive colostomy, site is clean, positive ileostomy conduit with clear urine per catheter  EXTREMITIES: Good range of motion on both upper and lower extremities except on right lower extremity which is wrapped in a long leg cast, able to wiggle right toes, left leg with trace pedal edema, no cyanosis or clubbing, no calf tenderness, right arm AV fistula with good bruits  SKIN: Warm and dry, no rashes or erythema noted    LABS:  Lab Results   Component Value Date    WBC 1.9 (LL) 04/12/2017    HGB 8.2 (L) 04/12/2017    HCT 23.8 (L) 04/12/2017    MCV 96 04/12/2017    PLT 93 (L) 04/12/2017     Lab Results   Component Value Date    CREATININE 3.88 (H) 04/12/2017    BUN 64 (H) 04/12/2017     04/12/2017    K 5.0 04/12/2017     (H) 04/12/2017    CO2 16 (L) 04/12/2017              Electronically signed by:Zeynep Mijares MD